# Patient Record
Sex: FEMALE | Race: WHITE | Employment: FULL TIME | URBAN - METROPOLITAN AREA
[De-identification: names, ages, dates, MRNs, and addresses within clinical notes are randomized per-mention and may not be internally consistent; named-entity substitution may affect disease eponyms.]

---

## 2019-06-10 ENCOUNTER — HOSPITAL ENCOUNTER (EMERGENCY)
Age: 29
Discharge: HOME | End: 2019-06-10
Attending: EMERGENCY MEDICINE
Payer: MEDICARE

## 2019-06-10 ENCOUNTER — APPOINTMENT (OUTPATIENT)
Dept: GENERAL RADIOLOGY | Age: 29
End: 2019-06-10
Payer: MEDICARE

## 2019-06-10 VITALS
OXYGEN SATURATION: 95 % | WEIGHT: 127 LBS | DIASTOLIC BLOOD PRESSURE: 59 MMHG | HEIGHT: 62 IN | TEMPERATURE: 98.2 F | RESPIRATION RATE: 16 BRPM | BODY MASS INDEX: 23.37 KG/M2 | HEART RATE: 61 BPM | SYSTOLIC BLOOD PRESSURE: 112 MMHG

## 2019-06-10 DIAGNOSIS — N39.0 URINARY TRACT INFECTION WITHOUT HEMATURIA, SITE UNSPECIFIED: Primary | ICD-10-CM

## 2019-06-10 DIAGNOSIS — R07.9 CHEST PAIN, UNSPECIFIED TYPE: ICD-10-CM

## 2019-06-10 LAB
-: ABNORMAL
ABSOLUTE EOS #: <0.03 K/UL (ref 0–0.44)
ABSOLUTE IMMATURE GRANULOCYTE: 0.02 K/UL (ref 0–0.3)
ABSOLUTE LYMPH #: 3.12 K/UL (ref 1.1–3.7)
ABSOLUTE MONO #: 0.43 K/UL (ref 0.1–1.2)
AMORPHOUS: ABNORMAL
ANION GAP SERPL CALCULATED.3IONS-SCNC: 12 MMOL/L (ref 9–17)
BACTERIA: ABNORMAL
BASOPHILS # BLD: 0 % (ref 0–2)
BASOPHILS ABSOLUTE: <0.03 K/UL (ref 0–0.2)
BILIRUBIN URINE: NEGATIVE
BUN BLDV-MCNC: 5 MG/DL (ref 6–20)
BUN/CREAT BLD: 8 (ref 9–20)
CALCIUM SERPL-MCNC: 9.3 MG/DL (ref 8.6–10.4)
CASTS UA: ABNORMAL /LPF
CHLORIDE BLD-SCNC: 99 MMOL/L (ref 98–107)
CHP ED QC CHECK: NORMAL
CO2: 27 MMOL/L (ref 20–31)
COLOR: YELLOW
COMMENT UA: ABNORMAL
CREAT SERPL-MCNC: 0.65 MG/DL (ref 0.5–0.9)
CRYSTALS, UA: ABNORMAL /HPF
D-DIMER QUANTITATIVE: 0.24 MG/L FEU
DIFFERENTIAL TYPE: ABNORMAL
EOSINOPHILS RELATIVE PERCENT: 0 % (ref 1–4)
EPITHELIAL CELLS UA: ABNORMAL /HPF (ref 0–5)
GFR AFRICAN AMERICAN: >60 ML/MIN
GFR NON-AFRICAN AMERICAN: >60 ML/MIN
GFR SERPL CREATININE-BSD FRML MDRD: ABNORMAL ML/MIN/{1.73_M2}
GFR SERPL CREATININE-BSD FRML MDRD: ABNORMAL ML/MIN/{1.73_M2}
GLUCOSE BLD-MCNC: 96 MG/DL (ref 70–99)
GLUCOSE URINE: NEGATIVE
HCT VFR BLD CALC: 40.5 % (ref 36.3–47.1)
HEMOGLOBIN: 12.5 G/DL (ref 11.9–15.1)
IMMATURE GRANULOCYTES: 0 %
KETONES, URINE: NEGATIVE
LEUKOCYTE ESTERASE, URINE: ABNORMAL
LYMPHOCYTES # BLD: 36 % (ref 24–43)
MCH RBC QN AUTO: 25.6 PG (ref 25.2–33.5)
MCHC RBC AUTO-ENTMCNC: 30.9 G/DL (ref 28.4–34.8)
MCV RBC AUTO: 83 FL (ref 82.6–102.9)
MONOCYTES # BLD: 5 % (ref 3–12)
MUCUS: ABNORMAL
NITRITE, URINE: POSITIVE
NRBC AUTOMATED: 0 PER 100 WBC
OTHER OBSERVATIONS UA: ABNORMAL
PDW BLD-RTO: 13.8 % (ref 11.8–14.4)
PH UA: 6 (ref 5–8)
PLATELET # BLD: 250 K/UL (ref 138–453)
PLATELET ESTIMATE: ABNORMAL
PMV BLD AUTO: 10.8 FL (ref 8.1–13.5)
POTASSIUM SERPL-SCNC: 3.9 MMOL/L (ref 3.7–5.3)
PREGNANCY TEST URINE, POC: NEGATIVE
PROTEIN UA: NEGATIVE
RBC # BLD: 4.88 M/UL (ref 3.95–5.11)
RBC # BLD: ABNORMAL 10*6/UL
RBC UA: ABNORMAL /HPF (ref 0–2)
RENAL EPITHELIAL, UA: ABNORMAL /HPF
SEG NEUTROPHILS: 59 % (ref 36–65)
SEGMENTED NEUTROPHILS ABSOLUTE COUNT: 5.09 K/UL (ref 1.5–8.1)
SODIUM BLD-SCNC: 138 MMOL/L (ref 135–144)
SPECIFIC GRAVITY UA: 1.01 (ref 1–1.03)
TRICHOMONAS: ABNORMAL
TURBIDITY: ABNORMAL
URINE HGB: NEGATIVE
UROBILINOGEN, URINE: NORMAL
WBC # BLD: 8.7 K/UL (ref 3.5–11.3)
WBC # BLD: ABNORMAL 10*3/UL
WBC UA: ABNORMAL /HPF (ref 0–5)
YEAST: ABNORMAL

## 2019-06-10 PROCEDURE — 85379 FIBRIN DEGRADATION QUANT: CPT

## 2019-06-10 PROCEDURE — 81025 URINE PREGNANCY TEST: CPT

## 2019-06-10 PROCEDURE — 87088 URINE BACTERIA CULTURE: CPT

## 2019-06-10 PROCEDURE — 80048 BASIC METABOLIC PNL TOTAL CA: CPT

## 2019-06-10 PROCEDURE — 71046 X-RAY EXAM CHEST 2 VIEWS: CPT

## 2019-06-10 PROCEDURE — 85025 COMPLETE CBC W/AUTO DIFF WBC: CPT

## 2019-06-10 PROCEDURE — 99285 EMERGENCY DEPT VISIT HI MDM: CPT

## 2019-06-10 PROCEDURE — 87086 URINE CULTURE/COLONY COUNT: CPT

## 2019-06-10 PROCEDURE — 93005 ELECTROCARDIOGRAM TRACING: CPT | Performed by: PHYSICIAN ASSISTANT

## 2019-06-10 PROCEDURE — 81001 URINALYSIS AUTO W/SCOPE: CPT

## 2019-06-10 PROCEDURE — 87186 SC STD MICRODIL/AGAR DIL: CPT

## 2019-06-10 RX ORDER — IBUPROFEN 800 MG/1
800 TABLET ORAL EVERY 8 HOURS PRN
Qty: 30 TABLET | Refills: 0 | Status: SHIPPED | OUTPATIENT
Start: 2019-06-10

## 2019-06-10 RX ORDER — ACETAMINOPHEN 500 MG
500 TABLET ORAL EVERY 6 HOURS PRN
COMMUNITY

## 2019-06-10 RX ORDER — CEPHALEXIN 500 MG/1
500 CAPSULE ORAL 2 TIMES DAILY
Qty: 14 CAPSULE | Refills: 0 | Status: SHIPPED | OUTPATIENT
Start: 2019-06-10 | End: 2019-06-17

## 2019-06-10 ASSESSMENT — PAIN SCALES - GENERAL
PAINLEVEL_OUTOF10: 5
PAINLEVEL_OUTOF10: 3

## 2019-06-10 ASSESSMENT — PAIN DESCRIPTION - FREQUENCY: FREQUENCY: CONTINUOUS

## 2019-06-10 ASSESSMENT — PAIN DESCRIPTION - DESCRIPTORS: DESCRIPTORS: SHARP

## 2019-06-10 ASSESSMENT — PAIN DESCRIPTION - PAIN TYPE: TYPE: ACUTE PAIN

## 2019-06-10 ASSESSMENT — PAIN DESCRIPTION - LOCATION: LOCATION: CHEST

## 2019-06-10 ASSESSMENT — PAIN DESCRIPTION - ONSET: ONSET: SUDDEN

## 2019-06-10 NOTE — ED NOTES
ASSESSMENT:    Presents to ED per self per pvt auto with c/o sharp pain across ant chest for last 13 hours. Rt worse than lt. Pain constant but increases and decreases in intensity. Seen at [facility] ED last week for the same and only did an EKG which was normal.  Patient smokes and is on Birth control pill. Feels sob at times. Not now. Denies fever,chills, n/v.  States pain does increase some with deep breath. Not to toych. Also pain goes around to rt upper back. No distress on admission.      Dot Mead RN  06/10/19 2469

## 2019-06-10 NOTE — PROGRESS NOTES
Transitions of Care Pharmacy Service   Medication Review    The patient's list of current home medications has been reviewed. She states she just ran out of her daily birth control tab but didn't know the name of it. [facility], and [facility] where has filled meds previously to clarify but she has not filled birth control at any of these pharmacies. Unable to verify med at this time. Source(s) of information: patient, [facility], [facility], [facility]    Please feel free to call with any questions about this encounter. Thank you.     Natalia Maguire, [facility]  Transitions of Care Pharmacy Service  Phone:  [phone]  Fax: [phone]          Prior to Admission medications    Medication Sig       UNKNOWN TO PATIENT Birth control tablet once daily       acetaminophen (TYLENOL) 500 MG tablet Take 500 mg by mouth every 6 hours as needed for Pain

## 2019-06-10 NOTE — ED PROVIDER NOTES
The patient was seen and examined by me in conjunction with the mid-level provider. I agree with his/her assessment and treatment plan. Her work-up including d-dimer is negative except for urinary tract infection. Findings were discussed with the patient.      Melva Castañeda MD  06/10/19 8777

## 2019-06-10 NOTE — ED PROVIDER NOTES
[address] [place] [address] ED  eMERGENCY dEPARTMENTeNCOUnter      Pt Name: Cris Conti  MRN: [id number]  [place] 1990  Date ofevaluation: 6/10/2019  Provider: Tawny Thomas Dr       Chief Complaint   Patient presents with    Chest Pain     sharp chest pain since last night         HISTORY OF PRESENT ILLNESS  (Location/Symptom, Timing/Onset, Context/Setting, Quality, Duration, Modifying Factors, Severity.)   Cris Conti is a 34 y.o. female who presents to the emergency department with chest pain that is bilateral but worse on the right side this started yesterday around 9:00. Pain is atraumatic. Patient states it came out of nowhere with gradual onset. Pain worse with deep breaths. It is pleuritic in nature. She did restart her birth control in January after the birth of her child. Prior to that she was on birth control for roughly 2 years without any complications. Denies any history of blood clots in legs or lungs. Denies any family history of blood clots in legs or lungs. Nursing Notes were reviewed. ALLERGIES     Hydrocodone-acetaminophen and Nalbuphine    CURRENT MEDICATIONS       Discharge Medication List as of 6/10/2019 11:22 AM      CONTINUE these medications which have NOT CHANGED    Details   UNKNOWN TO PATIENT Take 1 tablet by mouth daily Birth control tablet once dailyHistorical Med      acetaminophen (TYLENOL) 500 MG tablet Take 500 mg by mouth every 6 hours as needed for PainHistorical Med             PAST MEDICAL HISTORY   History reviewed. No pertinent past medical history. SURGICAL HISTORY           Procedure Laterality Date     SECTION           FAMILY HISTORY     History reviewed. No pertinent family history. No family status information on file. SOCIAL HISTORY      reports that she has been smoking cigarettes. She has been smoking about 0.50 packs per day.  She has never used smokeless tobacco. She reports that she drinks alcohol. She reports that she has current or past drug history. Drug: IV.    REVIEW OFSYSTEMS    (2-9 systems for level 4, 10 or more for level 5)   Review of Systems    Except as noted above the remainder of the review of systems was reviewed and negative. PHYSICAL EXAM    (up to 7 for level 4, 8 or more for level 5)     ED Triage Vitals [06/10/19 0855]   BP Temp Temp Source Pulse Resp SpO2 Height Weight   (!) 120/45 98.2 °F (36.8 °C) Oral 73 18 100 % 5' 2\" (1.575 m) 127 lb (57.6 kg)      Physical Exam   Constitutional: She is oriented to person, place, and time. She appears well-developed. HENT:   Head: Normocephalic and atraumatic. Neck: Normal range of motion. Neck supple. Cardiovascular: Normal rate and regular rhythm. Pulmonary/Chest: Effort normal and breath sounds normal.   Abdominal: Soft. She exhibits no distension. There is no tenderness. Musculoskeletal: Normal range of motion. Neurological: She is alert and oriented to person, place, and time. Skin: Skin is warm. No rash noted. Psychiatric: She has a normal mood and affect. Her behavior is normal.               DIAGNOSTIC RESULTS     EKG: All EKG's are interpreted by the Emergency Department Physician who either signs or Co-signs this chart in the absence of a cardiologist.    EKG interpreted by ED physician. Time 8:50 AM.  61 bpm.  Sinus rhythm.     RADIOLOGY:   Non-plain film images such as CT, Ultrasound and MRI are read by the radiologist. Plain radiographic images arevisualized and preliminarily interpreted by the emergency physician with the below findings:        Interpretation per the Radiologist below, if available at thetime of this note:          ED BEDSIDE ULTRASOUND:   Performed by ED Physician - none    LABS:  Labs Reviewed   CBC WITH AUTO DIFFERENTIAL - Abnormal; Notable for the following components:       Result Value    Eosinophils % 0 (*)     All other components within normal limits   BASIC METABOLIC PANEL - Abnormal; Notable for the following components:    BUN 5 (*)     Bun/Cre Ratio 8 (*)     All other components within normal limits   URINE RT REFLEX TO CULTURE - Abnormal; Notable for the following components:    Turbidity UA SLIGHTLY CLOUDY (*)     Nitrite, Urine POSITIVE (*)     Leukocyte Esterase, Urine TRACE (*)     All other components within normal limits   MICROSCOPIC URINALYSIS - Abnormal; Notable for the following components:    Bacteria, UA MODERATE (*)     All other components within normal limits   POCT URINE PREGNANCY - Normal   URINE CULTURE CLEAN CATCH   D-DIMER, QUANTITATIVE       All other labs were within normal range or not returned as of this dictation. EMERGENCY DEPARTMENT COURSE and DIFFERENTIAL DIAGNOSIS/MDM:   Vitals:    Vitals:    06/10/19 0855 06/10/19 1030 06/10/19 1100 06/10/19 1109   BP: (!) 120/45   (!) 112/59   Pulse: 73 59 63 61   Resp: 18 19 15 16   Temp: 98.2 °F (36.8 °C)      TempSrc: Oral      SpO2: 100%   95%   Weight: 127 lb (57.6 kg)      Height: 5' 2\" (1.575 m)        D-dimer is negative. Chest x-ray and labs are negative. Urine was found to be foul-smelling. UTI found on urine dip and urine analysis    Treated with Keflex. Discharge home. Also given NSAIDs for chest pain. CONSULTS:  None    PROCEDURES:  Procedures        FINAL IMPRESSION      1. Urinary tract infection without hematuria, site unspecified    2. Chest pain, unspecified type          DISPOSITION/PLAN   DISPOSITION Decision To Discharge 06/10/2019 11:20:17 AM      PATIENTREFERRED TO:   No follow-up provider specified.     DISCHARGE MEDICATIONS:     Discharge Medication List as of 6/10/2019 11:22 AM      START taking these medications    Details   cephALEXin (KEFLEX) 500 MG capsule Take 1 capsule by mouth 2 times daily for 7 days, Disp-14 capsule, R-0Print      ibuprofen (ADVIL;MOTRIN) 800 MG tablet Take 1 tablet by mouth every 8 hours as needed for Pain, Disp-30 tablet, R-0Print                 (Please note that portions of this note were completed with a voice recognition program.  Efforts were made to edit thedictations but occasionally words are mis-transcribed.)    HI Chowdhury PA-C  06/10/19 2144

## 2019-06-12 LAB
CULTURE: ABNORMAL
EKG ATRIAL RATE: 61 BPM
EKG P AXIS: 6 DEGREES
EKG P-R INTERVAL: 136 MS
EKG Q-T INTERVAL: 428 MS
EKG QRS DURATION: 86 MS
EKG QTC CALCULATION (BAZETT): 430 MS
EKG R AXIS: 60 DEGREES
EKG T AXIS: 42 DEGREES
EKG VENTRICULAR RATE: 61 BPM
HCG, PREGNANCY URINE (POC): NEGATIVE
Lab: ABNORMAL
SPECIMEN DESCRIPTION: ABNORMAL

## 2019-06-12 NOTE — PROGRESS NOTES
Culture Callback Review:   Patient has positive urine culture for E Coli > 100,000 CFU/mL. Discharged from ER on antibiotic therapy with Keflex 500mg BID x 7 days . Recommendation: Discontinue antibiotics if continued no urinary symptoms. No urinary symptoms noted on chart, patient presented with chest pain. Not pregnant. High bacteria count. Would classify as asymptomatic bacteriuria . Chapis Up PharmD, [facility]  6/12/2019 10:56 AM       Above recommendation accepted per Dr. Mela Mckeon.      Chapis Up PharmD, [facility]  6/12/2019 11:29 AM

## 2020-11-08 ENCOUNTER — APPOINTMENT (OUTPATIENT)
Dept: GENERAL RADIOLOGY | Age: 30
End: 2020-11-08
Payer: MEDICARE

## 2020-11-08 ENCOUNTER — APPOINTMENT (OUTPATIENT)
Dept: CT IMAGING | Age: 30
End: 2020-11-08
Payer: MEDICARE

## 2020-11-08 ENCOUNTER — HOSPITAL ENCOUNTER (EMERGENCY)
Age: 30
Discharge: ANOTHER HOSPITAL | End: 2020-11-08
Attending: EMERGENCY MEDICINE
Payer: MEDICARE

## 2020-11-08 VITALS
HEART RATE: 70 BPM | TEMPERATURE: 97.9 F | WEIGHT: 130 LBS | RESPIRATION RATE: 18 BRPM | SYSTOLIC BLOOD PRESSURE: 98 MMHG | DIASTOLIC BLOOD PRESSURE: 64 MMHG | OXYGEN SATURATION: 100 %

## 2020-11-08 PROBLEM — J98.2 PNEUMOMEDIASTINUM (HCC): Status: ACTIVE | Noted: 2020-11-08

## 2020-11-08 PROBLEM — N17.9 ACUTE KIDNEY INJURY (HCC): Status: ACTIVE | Noted: 2020-11-08

## 2020-11-08 LAB
-: ABNORMAL
-: NORMAL
ABSOLUTE EOS #: 0 K/UL (ref 0–0.4)
ABSOLUTE IMMATURE GRANULOCYTE: 0 K/UL (ref 0–0.3)
ABSOLUTE LYMPH #: 1.15 K/UL (ref 1–4.8)
ABSOLUTE MONO #: 1.84 K/UL (ref 0.1–0.8)
ACETAMINOPHEN LEVEL: <5 UG/ML (ref 10–30)
ALBUMIN SERPL-MCNC: 5.3 G/DL (ref 3.5–5.2)
ALBUMIN/GLOBULIN RATIO: 1.3 (ref 1–2.5)
ALLEN TEST: ABNORMAL
ALLEN TEST: NORMAL
ALP BLD-CCNC: 80 U/L (ref 35–104)
ALT SERPL-CCNC: 37 U/L (ref 5–33)
AMMONIA: 42 UMOL/L (ref 11–51)
AMORPHOUS: ABNORMAL
AMPHETAMINE SCREEN URINE: NEGATIVE
ANION GAP SERPL CALCULATED.3IONS-SCNC: 17 MMOL/L (ref 9–17)
ANION GAP SERPL CALCULATED.3IONS-SCNC: 29 MMOL/L (ref 9–17)
ANION GAP: 12 MMOL/L (ref 7–16)
AST SERPL-CCNC: 56 U/L
BACTERIA: ABNORMAL
BARBITURATE SCREEN URINE: NEGATIVE
BASOPHILS # BLD: 0 % (ref 0–2)
BASOPHILS ABSOLUTE: 0 K/UL (ref 0–0.2)
BENZODIAZEPINE SCREEN, URINE: NEGATIVE
BILIRUB SERPL-MCNC: 1.19 MG/DL (ref 0.3–1.2)
BILIRUBIN URINE: NEGATIVE
BNP INTERPRETATION: ABNORMAL
BUN BLDV-MCNC: 107 MG/DL (ref 6–20)
BUN BLDV-MCNC: 96 MG/DL (ref 6–20)
BUN/CREAT BLD: ABNORMAL (ref 9–20)
BUN/CREAT BLD: ABNORMAL (ref 9–20)
BUPRENORPHINE URINE: ABNORMAL
CALCIUM SERPL-MCNC: 10.5 MG/DL (ref 8.6–10.4)
CALCIUM SERPL-MCNC: 8 MG/DL (ref 8.6–10.4)
CANNABINOID SCREEN URINE: POSITIVE
CARBOXYHEMOGLOBIN: 1 % (ref 0–5)
CASTS UA: ABNORMAL /LPF (ref 0–2)
CASTS UA: ABNORMAL /LPF (ref 0–2)
CHLORIDE BLD-SCNC: 111 MMOL/L (ref 98–107)
CHLORIDE BLD-SCNC: 94 MMOL/L (ref 98–107)
CHP ED QC CHECK: YES
CO2: 21 MMOL/L (ref 20–31)
CO2: 22 MMOL/L (ref 20–31)
COCAINE METABOLITE, URINE: POSITIVE
COLOR: ABNORMAL
COMMENT UA: ABNORMAL
CREAT SERPL-MCNC: 2.91 MG/DL (ref 0.5–0.9)
CREAT SERPL-MCNC: 4.14 MG/DL (ref 0.5–0.9)
CRYSTALS, UA: ABNORMAL /HPF
D-DIMER QUANTITATIVE: 2.23 MG/L FEU
DIFFERENTIAL TYPE: ABNORMAL
EOSINOPHILS RELATIVE PERCENT: 0 % (ref 1–4)
EPITHELIAL CELLS UA: ABNORMAL /HPF (ref 0–5)
ETHANOL PERCENT: <0.01 %
ETHANOL: <10 MG/DL
FIO2: ABNORMAL
FIO2: NORMAL
GFR AFRICAN AMERICAN: 16 ML/MIN
GFR AFRICAN AMERICAN: 23 ML/MIN
GFR NON-AFRICAN AMERICAN: 11 ML/MIN
GFR NON-AFRICAN AMERICAN: 13 ML/MIN
GFR NON-AFRICAN AMERICAN: 19 ML/MIN
GFR SERPL CREATININE-BSD FRML MDRD: 13 ML/MIN
GFR SERPL CREATININE-BSD FRML MDRD: ABNORMAL ML/MIN/{1.73_M2}
GLUCOSE BLD-MCNC: 106 MG/DL (ref 70–99)
GLUCOSE BLD-MCNC: 115 MG/DL (ref 70–99)
GLUCOSE BLD-MCNC: 127 MG/DL (ref 74–100)
GLUCOSE BLD-MCNC: 170 MG/DL
GLUCOSE BLD-MCNC: 170 MG/DL (ref 65–105)
GLUCOSE URINE: NEGATIVE
HCG QUALITATIVE: NEGATIVE
HCO3 VENOUS: 25.6 MMOL/L (ref 24–30)
HCO3 VENOUS: 27.7 MMOL/L (ref 22–29)
HCT VFR BLD CALC: 47.1 % (ref 36.3–47.1)
HEMOGLOBIN: 14.9 G/DL (ref 11.9–15.1)
IMMATURE GRANULOCYTES: 0 %
INR BLD: 1.1
KETONES, URINE: ABNORMAL
LACTIC ACID, SEPSIS WHOLE BLOOD: 1.9 MMOL/L (ref 0.5–1.9)
LACTIC ACID, SEPSIS WHOLE BLOOD: 2.6 MMOL/L (ref 0.5–1.9)
LACTIC ACID, SEPSIS: ABNORMAL MMOL/L (ref 0.5–1.9)
LACTIC ACID, SEPSIS: NORMAL MMOL/L (ref 0.5–1.9)
LEUKOCYTE ESTERASE, URINE: ABNORMAL
LYMPHOCYTES # BLD: 5 % (ref 24–44)
MCH RBC QN AUTO: 24 PG (ref 25.2–33.5)
MCHC RBC AUTO-ENTMCNC: 31.6 G/DL (ref 28.4–34.8)
MCV RBC AUTO: 76 FL (ref 82.6–102.9)
MDMA URINE: ABNORMAL
METHADONE SCREEN, URINE: NEGATIVE
METHAMPHETAMINE, URINE: ABNORMAL
METHEMOGLOBIN: NORMAL % (ref 0–1.5)
MODE: ABNORMAL
MODE: NORMAL
MONOCYTES # BLD: 8 % (ref 1–7)
MORPHOLOGY: ABNORMAL
MORPHOLOGY: ABNORMAL
MUCUS: ABNORMAL
MYOGLOBIN: 521 NG/ML (ref 25–58)
NEGATIVE BASE EXCESS, VEN: ABNORMAL (ref 0–2)
NEGATIVE BASE EXCESS, VEN: NORMAL MMOL/L (ref 0–2)
NITRITE, URINE: NEGATIVE
NOTIFICATION TIME: NORMAL
NOTIFICATION: NORMAL
NRBC AUTOMATED: 0 PER 100 WBC
O2 DEVICE/FLOW/%: ABNORMAL
O2 DEVICE/FLOW/%: NORMAL
O2 SAT, VEN: 62 % (ref 60–85)
O2 SAT, VEN: 66 % (ref 60–85)
OPIATES, URINE: NEGATIVE
OTHER OBSERVATIONS UA: ABNORMAL
OXYCODONE SCREEN URINE: NEGATIVE
OXYHEMOGLOBIN: NORMAL % (ref 95–98)
PARTIAL THROMBOPLASTIN TIME: 21.2 SEC (ref 20.5–30.5)
PARTIAL THROMBOPLASTIN TIME: 67.3 SEC (ref 20.5–30.5)
PATIENT TEMP: 37
PATIENT TEMP: ABNORMAL
PCO2, VEN, TEMP ADJ: NORMAL MMHG (ref 39–55)
PCO2, VEN: 39.6 MM HG (ref 41–51)
PCO2, VEN: 43 (ref 39–55)
PDW BLD-RTO: 17.3 % (ref 11.8–14.4)
PEEP/CPAP: NORMAL
PH UA: 5 (ref 5–8)
PH VENOUS: 7.39 (ref 7.32–7.42)
PH VENOUS: 7.45 (ref 7.32–7.43)
PH, VEN, TEMP ADJ: NORMAL (ref 7.32–7.42)
PHENCYCLIDINE, URINE: NEGATIVE
PLATELET # BLD: ABNORMAL K/UL (ref 138–453)
PLATELET ESTIMATE: ABNORMAL
PLATELET, FLUORESCENCE: 215 K/UL (ref 138–453)
PLATELET, IMMATURE FRACTION: 6.4 % (ref 1.1–10.3)
PMV BLD AUTO: ABNORMAL FL (ref 8.1–13.5)
PO2, VEN, TEMP ADJ: NORMAL MMHG (ref 30–50)
PO2, VEN: 30.4 MM HG (ref 30–50)
PO2, VEN: 41.9 (ref 30–50)
POC CHLORIDE: 107 MMOL/L (ref 98–107)
POC CREATININE: 4.72 MG/DL (ref 0.51–1.19)
POC HEMATOCRIT: 60 % (ref 36–46)
POC HEMOGLOBIN: 20.3 G/DL (ref 12–16)
POC IONIZED CALCIUM: 1.01 MMOL/L (ref 1.15–1.33)
POC LACTIC ACID: 2.66 MMOL/L (ref 0.56–1.39)
POC PCO2 TEMP: ABNORMAL MM HG
POC PH TEMP: ABNORMAL
POC PO2 TEMP: ABNORMAL MM HG
POC POTASSIUM: 3.8 MMOL/L (ref 3.5–4.5)
POC SODIUM: 147 MMOL/L (ref 138–146)
POSITIVE BASE EXCESS, VEN: 0.9 MMOL/L (ref 0–2)
POSITIVE BASE EXCESS, VEN: 4 (ref 0–3)
POTASSIUM SERPL-SCNC: 2.8 MMOL/L (ref 3.7–5.3)
POTASSIUM SERPL-SCNC: 3.6 MMOL/L (ref 3.7–5.3)
PRO-BNP: 2232 PG/ML
PROPOXYPHENE, URINE: ABNORMAL
PROTEIN UA: ABNORMAL
PROTHROMBIN TIME: 11.1 SEC (ref 9–12)
PSV: NORMAL
PT. POSITION: NORMAL
RBC # BLD: 6.2 M/UL (ref 3.95–5.11)
RBC # BLD: ABNORMAL 10*6/UL
RBC UA: ABNORMAL /HPF (ref 0–2)
REASON FOR REJECTION: NORMAL
RENAL EPITHELIAL, UA: ABNORMAL /HPF
RESPIRATORY RATE: NORMAL
SALICYLATE LEVEL: <1 MG/DL (ref 3–10)
SAMPLE SITE: ABNORMAL
SAMPLE SITE: NORMAL
SARS-COV-2, RAPID: NOT DETECTED
SARS-COV-2: NORMAL
SARS-COV-2: NORMAL
SEG NEUTROPHILS: 87 % (ref 36–66)
SEGMENTED NEUTROPHILS ABSOLUTE COUNT: 20.01 K/UL (ref 1.8–7.7)
SET RATE: NORMAL
SODIUM BLD-SCNC: 145 MMOL/L (ref 135–144)
SODIUM BLD-SCNC: 149 MMOL/L (ref 135–144)
SOURCE: NORMAL
SPECIFIC GRAVITY UA: 1.02 (ref 1–1.03)
TEST INFORMATION: ABNORMAL
TEXT FOR RESPIRATORY: NORMAL
TOTAL CK: 130 U/L (ref 26–192)
TOTAL CO2, VENOUS: 29 MMOL/L (ref 23–30)
TOTAL HB: NORMAL G/DL (ref 12–16)
TOTAL PROTEIN: 9.3 G/DL (ref 6.4–8.3)
TOTAL RATE: NORMAL
TOXIC TRICYCLIC SC,BLOOD: NEGATIVE
TRICHOMONAS: ABNORMAL
TRICYCLIC ANTIDEPRESSANTS, UR: ABNORMAL
TROPONIN INTERP: ABNORMAL
TROPONIN INTERP: ABNORMAL
TROPONIN T: ABNORMAL NG/ML
TROPONIN T: ABNORMAL NG/ML
TROPONIN, HIGH SENSITIVITY: 134 NG/L (ref 0–14)
TROPONIN, HIGH SENSITIVITY: 150 NG/L (ref 0–14)
TURBIDITY: ABNORMAL
URINE HGB: ABNORMAL
UROBILINOGEN, URINE: NORMAL
VT: NORMAL
WBC # BLD: 23 K/UL (ref 3.5–11.3)
WBC # BLD: ABNORMAL 10*3/UL
WBC UA: ABNORMAL /HPF (ref 0–5)
YEAST: ABNORMAL
ZZ NTE CLEAN UP: ORDERED TEST: NORMAL
ZZ NTE WITH NAME CLEAN UP: SPECIMEN SOURCE: NORMAL

## 2020-11-08 PROCEDURE — 85730 THROMBOPLASTIN TIME PARTIAL: CPT

## 2020-11-08 PROCEDURE — 85610 PROTHROMBIN TIME: CPT

## 2020-11-08 PROCEDURE — G0480 DRUG TEST DEF 1-7 CLASSES: HCPCS

## 2020-11-08 PROCEDURE — 84132 ASSAY OF SERUM POTASSIUM: CPT

## 2020-11-08 PROCEDURE — 6360000002 HC RX W HCPCS: Performed by: STUDENT IN AN ORGANIZED HEALTH CARE EDUCATION/TRAINING PROGRAM

## 2020-11-08 PROCEDURE — 31500 INSERT EMERGENCY AIRWAY: CPT

## 2020-11-08 PROCEDURE — 96375 TX/PRO/DX INJ NEW DRUG ADDON: CPT

## 2020-11-08 PROCEDURE — 85025 COMPLETE CBC W/AUTO DIFF WBC: CPT

## 2020-11-08 PROCEDURE — 99285 EMERGENCY DEPT VISIT HI MDM: CPT

## 2020-11-08 PROCEDURE — 93005 ELECTROCARDIOGRAM TRACING: CPT | Performed by: STUDENT IN AN ORGANIZED HEALTH CARE EDUCATION/TRAINING PROGRAM

## 2020-11-08 PROCEDURE — 82803 BLOOD GASES ANY COMBINATION: CPT

## 2020-11-08 PROCEDURE — 81001 URINALYSIS AUTO W/SCOPE: CPT

## 2020-11-08 PROCEDURE — 87086 URINE CULTURE/COLONY COUNT: CPT

## 2020-11-08 PROCEDURE — 6360000002 HC RX W HCPCS

## 2020-11-08 PROCEDURE — 83880 ASSAY OF NATRIURETIC PEPTIDE: CPT

## 2020-11-08 PROCEDURE — 93005 ELECTROCARDIOGRAM TRACING: CPT | Performed by: EMERGENCY MEDICINE

## 2020-11-08 PROCEDURE — 2500000003 HC RX 250 WO HCPCS

## 2020-11-08 PROCEDURE — 71045 X-RAY EXAM CHEST 1 VIEW: CPT

## 2020-11-08 PROCEDURE — 96361 HYDRATE IV INFUSION ADD-ON: CPT

## 2020-11-08 PROCEDURE — 84295 ASSAY OF SERUM SODIUM: CPT

## 2020-11-08 PROCEDURE — 80048 BASIC METABOLIC PNL TOTAL CA: CPT

## 2020-11-08 PROCEDURE — 74220 X-RAY XM ESOPHAGUS 1CNTRST: CPT

## 2020-11-08 PROCEDURE — 36415 COLL VENOUS BLD VENIPUNCTURE: CPT

## 2020-11-08 PROCEDURE — 96366 THER/PROPH/DIAG IV INF ADDON: CPT

## 2020-11-08 PROCEDURE — 84703 CHORIONIC GONADOTROPIN ASSAY: CPT

## 2020-11-08 PROCEDURE — 87040 BLOOD CULTURE FOR BACTERIA: CPT

## 2020-11-08 PROCEDURE — 83605 ASSAY OF LACTIC ACID: CPT

## 2020-11-08 PROCEDURE — 80307 DRUG TEST PRSMV CHEM ANLYZR: CPT

## 2020-11-08 PROCEDURE — 82140 ASSAY OF AMMONIA: CPT

## 2020-11-08 PROCEDURE — U0002 COVID-19 LAB TEST NON-CDC: HCPCS

## 2020-11-08 PROCEDURE — 82805 BLOOD GASES W/O2 SATURATION: CPT

## 2020-11-08 PROCEDURE — 82330 ASSAY OF CALCIUM: CPT

## 2020-11-08 PROCEDURE — 94761 N-INVAS EAR/PLS OXIMETRY MLT: CPT

## 2020-11-08 PROCEDURE — 36556 INSERT NON-TUNNEL CV CATH: CPT

## 2020-11-08 PROCEDURE — 82947 ASSAY GLUCOSE BLOOD QUANT: CPT

## 2020-11-08 PROCEDURE — 82435 ASSAY OF BLOOD CHLORIDE: CPT

## 2020-11-08 PROCEDURE — 82550 ASSAY OF CK (CPK): CPT

## 2020-11-08 PROCEDURE — 2700000000 HC OXYGEN THERAPY PER DAY

## 2020-11-08 PROCEDURE — 82565 ASSAY OF CREATININE: CPT

## 2020-11-08 PROCEDURE — 85379 FIBRIN DEGRADATION QUANT: CPT

## 2020-11-08 PROCEDURE — 51702 INSERT TEMP BLADDER CATH: CPT

## 2020-11-08 PROCEDURE — 84484 ASSAY OF TROPONIN QUANT: CPT

## 2020-11-08 PROCEDURE — 83874 ASSAY OF MYOGLOBIN: CPT

## 2020-11-08 PROCEDURE — 96368 THER/DIAG CONCURRENT INF: CPT

## 2020-11-08 PROCEDURE — 96367 TX/PROPH/DG ADDL SEQ IV INF: CPT

## 2020-11-08 PROCEDURE — 94770 HC ETCO2 MONITOR DAILY: CPT

## 2020-11-08 PROCEDURE — 80053 COMPREHEN METABOLIC PANEL: CPT

## 2020-11-08 PROCEDURE — 71250 CT THORAX DX C-: CPT

## 2020-11-08 PROCEDURE — 2580000003 HC RX 258: Performed by: STUDENT IN AN ORGANIZED HEALTH CARE EDUCATION/TRAINING PROGRAM

## 2020-11-08 PROCEDURE — 6360000004 HC RX CONTRAST MEDICATION: Performed by: STUDENT IN AN ORGANIZED HEALTH CARE EDUCATION/TRAINING PROGRAM

## 2020-11-08 PROCEDURE — 85055 RETICULATED PLATELET ASSAY: CPT

## 2020-11-08 PROCEDURE — 96365 THER/PROPH/DIAG IV INF INIT: CPT

## 2020-11-08 PROCEDURE — 85014 HEMATOCRIT: CPT

## 2020-11-08 RX ORDER — 0.9 % SODIUM CHLORIDE 0.9 %
1000 INTRAVENOUS SOLUTION INTRAVENOUS ONCE
Status: COMPLETED | OUTPATIENT
Start: 2020-11-08 | End: 2020-11-08

## 2020-11-08 RX ORDER — PROPOFOL 10 MG/ML
10 INJECTION, EMULSION INTRAVENOUS
Status: DISCONTINUED | OUTPATIENT
Start: 2020-11-08 | End: 2020-11-09 | Stop reason: HOSPADM

## 2020-11-08 RX ORDER — HEPARIN SODIUM 1000 [USP'U]/ML
80 INJECTION, SOLUTION INTRAVENOUS; SUBCUTANEOUS PRN
Status: DISCONTINUED | OUTPATIENT
Start: 2020-11-08 | End: 2020-11-09 | Stop reason: HOSPADM

## 2020-11-08 RX ORDER — ETOMIDATE 2 MG/ML
INJECTION INTRAVENOUS
Status: DISCONTINUED
Start: 2020-11-08 | End: 2020-11-09 | Stop reason: HOSPADM

## 2020-11-08 RX ORDER — MAGNESIUM SULFATE 1 G/100ML
1 INJECTION INTRAVENOUS
Status: COMPLETED | OUTPATIENT
Start: 2020-11-08 | End: 2020-11-08

## 2020-11-08 RX ORDER — POTASSIUM CHLORIDE 7.45 MG/ML
10 INJECTION INTRAVENOUS
Status: ACTIVE | OUTPATIENT
Start: 2020-11-08 | End: 2020-11-08

## 2020-11-08 RX ORDER — VANCOMYCIN HYDROCHLORIDE 1 G/200ML
1000 INJECTION, SOLUTION INTRAVENOUS ONCE
Status: COMPLETED | OUTPATIENT
Start: 2020-11-08 | End: 2020-11-09

## 2020-11-08 RX ORDER — 0.9 % SODIUM CHLORIDE 0.9 %
1000 INTRAVENOUS SOLUTION INTRAVENOUS ONCE
Status: COMPLETED | OUTPATIENT
Start: 2020-11-08 | End: 2020-11-09

## 2020-11-08 RX ORDER — HEPARIN SODIUM 10000 [USP'U]/100ML
18 INJECTION, SOLUTION INTRAVENOUS CONTINUOUS
Status: DISCONTINUED | OUTPATIENT
Start: 2020-11-08 | End: 2020-11-09 | Stop reason: HOSPADM

## 2020-11-08 RX ORDER — HEPARIN SODIUM 1000 [USP'U]/ML
80 INJECTION, SOLUTION INTRAVENOUS; SUBCUTANEOUS ONCE
Status: COMPLETED | OUTPATIENT
Start: 2020-11-08 | End: 2020-11-08

## 2020-11-08 RX ORDER — HEPARIN SODIUM 1000 [USP'U]/ML
40 INJECTION, SOLUTION INTRAVENOUS; SUBCUTANEOUS PRN
Status: DISCONTINUED | OUTPATIENT
Start: 2020-11-08 | End: 2020-11-09 | Stop reason: HOSPADM

## 2020-11-08 RX ORDER — POTASSIUM CHLORIDE 7.45 MG/ML
10 INJECTION INTRAVENOUS
Status: DISPENSED | OUTPATIENT
Start: 2020-11-08 | End: 2020-11-08

## 2020-11-08 RX ORDER — PROPOFOL 10 MG/ML
INJECTION, EMULSION INTRAVENOUS
Status: COMPLETED
Start: 2020-11-08 | End: 2020-11-08

## 2020-11-08 RX ADMIN — PROPOFOL 20 MCG/KG/MIN: 10 INJECTION, EMULSION INTRAVENOUS at 14:50

## 2020-11-08 RX ADMIN — DIATRIZOATE MEGLUMINE AND DIATRIZOATE SODIUM 50 ML: 660; 100 LIQUID ORAL; RECTAL at 09:20

## 2020-11-08 RX ADMIN — SODIUM CHLORIDE 1000 ML: 9 INJECTION, SOLUTION INTRAVENOUS at 02:50

## 2020-11-08 RX ADMIN — VANCOMYCIN HYDROCHLORIDE 1000 MG: 1 INJECTION, SOLUTION INTRAVENOUS at 03:52

## 2020-11-08 RX ADMIN — SODIUM CHLORIDE 1000 ML: 9 INJECTION, SOLUTION INTRAVENOUS at 05:42

## 2020-11-08 RX ADMIN — POTASSIUM CHLORIDE 10 MEQ: 7.46 INJECTION, SOLUTION INTRAVENOUS at 08:51

## 2020-11-08 RX ADMIN — POTASSIUM CHLORIDE 10 MEQ: 7.46 INJECTION, SOLUTION INTRAVENOUS at 11:25

## 2020-11-08 RX ADMIN — MAGNESIUM SULFATE HEPTAHYDRATE 1 G: 1 INJECTION, SOLUTION INTRAVENOUS at 10:05

## 2020-11-08 RX ADMIN — POTASSIUM CHLORIDE 10 MEQ: 7.46 INJECTION, SOLUTION INTRAVENOUS at 10:05

## 2020-11-08 RX ADMIN — PIPERACILLIN AND TAZOBACTAM 4.5 G: 4; .5 INJECTION, POWDER, LYOPHILIZED, FOR SOLUTION INTRAVENOUS; PARENTERAL at 05:07

## 2020-11-08 RX ADMIN — MAGNESIUM SULFATE HEPTAHYDRATE 1 G: 1 INJECTION, SOLUTION INTRAVENOUS at 08:52

## 2020-11-08 RX ADMIN — HEPARIN SODIUM 4720 UNITS: 1000 INJECTION INTRAVENOUS; SUBCUTANEOUS at 05:38

## 2020-11-08 RX ADMIN — POTASSIUM CHLORIDE 10 MEQ: 7.46 INJECTION, SOLUTION INTRAVENOUS at 13:11

## 2020-11-08 RX ADMIN — HEPARIN SODIUM AND DEXTROSE 18 UNITS/KG/HR: 10000; 5 INJECTION INTRAVENOUS at 05:38

## 2020-11-08 ASSESSMENT — ENCOUNTER SYMPTOMS: TACHYPNEA: 1

## 2020-11-08 NOTE — ED NOTES
Intubated with a 7.5 ett @ 25 lip    . Suction given Cords visualized Good breaths sound.  Positive change CXR ordered     Jones Anaya RN  11/08/20 6839

## 2020-11-08 NOTE — ED NOTES
Report called to [facility] at [facility].        King Lemus RN  11/08/20 4581

## 2020-11-08 NOTE — CONSULTS
[facility]   Consult Note                 Date:   11/8/2020  Date of admission:  11/8/2020  1:42 AM  MRN:   [id number]  YOB: 1990    Reason for Consult: Elevated tropes    HISTORY OF PRESENT ILLNESS:    The patient is a 31 y.o.  female who is admitted to the hospital .  A 29-year-old female incarcerated brought to the ER with history of hematemesis. Patient is somewhat confused not responding much to the questions not answering to the questions. Denies any chest pain pressure patient first tropes 134, proBNP 2232, creatinine 4.14 now 2.91 patient is on high flow. Chest x-ray shows pneumothorax around the esophagus, CT multiple bilateral groundglass nodular opacities suggestive of infection, small amount of pneumonia mediastinum in the posterior mediastinum base around the esophagus  indeterminate etiology  Patient is awaiting to go to the other facility as also facial surgery is not done here and patient cannot cross straight line waiting to be excepted in Critical access hospital    Past Medical History:   has no past medical history on file. Past Surgical History:   has no past surgical history on file. Home Medications:    Prior to Admission medications    Not on File       Current Medications: Scheduled Meds:   potassium chloride  10 mEq Intravenous Q1H    magnesium sulfate  1 g Intravenous Q1H     Continuous Infusions:   heparin (PORCINE) Infusion 18 Units/kg/hr (11/08/20 0538)     PRN Meds:.heparin (porcine), heparin (porcine)     Allergies:  Patient has no known allergies. Social History:        Family History:     Review of Systems hard to get from patient  CONSTITUTIONAL:     EYES:     HEENT:     RESPIRATORY:     CARDIOVASCULAR:     GASTROINTESTINAL:     GENITOURINARY:     MUSCULOSKELETAL:     NEUROLOGICAL:     PSYCHIATRIC:                PHYSICAL EXAM:    Blood pressure 112/84, pulse 86, temperature 97.9 °F (36.6 °C), temperature source Oral, resp.  rate 30, weight 130 lb (59 kg), SpO2 91 %. CONSTITUTIONAL: AOx4, no apparent distress, somewhat confused  HEAD: normocephalic, atraumatic   EYES: PERRLA, EOMI   ENT: moist mucous membranes, uvula midline   NECK:  symmetric, no midline tenderness to palpation   LUNGS: clear to auscultation bilaterally   CARDIOVASCULAR: regular rate and rhythm, no murmurs, rubs or gallops   ABDOMEN: Soft, non-tender, non-distended with normal active bowel sounds   SKIN: no rash       DATA:    ECG: Normal sinus rhythm, marked ST depressions LVH  Echo:  Stress:  Cath:    Labs:     CBC:   Recent Labs     11/08/20 0244   WBC 23.0*   HGB 14.9   HCT 47.1   PLT See Reflexed IPF Result     BMP:   Recent Labs     11/08/20 0244 11/08/20 0537 11/08/20 0717   *  --  149*   K 3.6*  --  2.8*   CO2 22  --  21   *  --  96*   CREATININE 4.14*  --  2.91*   LABGLOM 13*  --  19*   GLUCOSE 115* 170 106*     BNP: No results for input(s): BNP in the last 72 hours. PT/INR:   Recent Labs     11/08/20  0250   PROTIME 11.1   INR 1.1     APTT:  Recent Labs     11/08/20 0250   APTT 21.2     CARDIAC ENZYMES:  Recent Labs     11/08/20 0244   CKTOTAL 130     FASTING LIPID PANEL:No results found for: HDL, LDLDIRECT, LDLCALC, TRIG  LIVER PROFILE:  Recent Labs     11/08/20 0244   AST 56*   ALT 37*   LABALBU 5.3*       Intake/Output Summary (Last 24 hours) at 11/8/2020 8616  Last data filed at 11/8/2020 7863  Gross per 24 hour   Intake 2300 ml   Output 32 ml   Net 2268 ml       IMPRESSION:    There is no problem list on file for this patient. RECOMMENDATIONS:  Positive tropes with marked EKG ST depressions given the history of cocaine, anemia , possible type I, given hematemesis, with possible rupture esophagus with pneumothorax  Patient need surgery at this time patient is awaiting to be excepted in [facility] clinic, as patient is incarcerated patient cannot cross the state line to [place]  At this time anticoagulation will be waited.   Patient denies any chest pain at this time  ANTIONE getting better with IV hydration may need also nephrology to be involved  Hypertension is fairly controlled, heart rate is controlled  Patient is positive for cocaine and cannabis. Patient H&H need to be watched  Discussed with patient and nursing.     Paty Hendrix [facility]        [phone]

## 2020-11-08 NOTE — ED NOTES
Pt complaining of lower abdominal pain.  Dr. Tana Soto at bedside with med student     Ashanti Angel, CASANDRA  11/08/20 2241

## 2020-11-08 NOTE — ED NOTES
Writer had guards removed handcuffs to promote comfort. Patient is not violent. Awaiting transport. NRB is place. VS as charted. NAD noted at this time. Guards remain at bedside.       Milka Curiel RN  11/08/20 3914

## 2020-11-08 NOTE — ED PROVIDER NOTES
[address] [facility]  ED  Emergency Department Encounter  EmergencyMedicine Resident     Pt Name:Tabitha Gomez  MRN: [id number]  [place] 1990  Date of evaluation: 11/8/20  PCP:  No primary care provider on file. CHIEF COMPLAINT       Chief Complaint   Patient presents with    Altered Mental Status    Withdrawal       HISTORY OF PRESENT ILLNESS  (Location/Symptom, Timing/Onset, Context/Setting, Quality, Duration, Modifying Factors, Severity.)      Melene Olszewski is a 31 y.o. female who presents with altered mental status and 1 day history of multiple episodes of nonbloody emesis. Patient was brought by [employer], incarcerated. Reported patient may have overdosed on Xanax and heroin, may be withdrawing. Patient was short of breath and satting at 80 when EMS arrived. Patient hypotensive with systolic in the 593W. Patient not complaining of pain, selectively verbal.    Patient tachycardic to 148, respirate in the 40s. Blood pressure systolic 985. Afebrile. Satting at 88 on room air, now satting at 95 on 15 L of nonrebreather. Patient had poor vascular access, central line was done at right femoral.    PAST MEDICAL / SURGICAL / SOCIAL / FAMILY HISTORY      has no past medical history on file. has no past surgical history on file.       Social History     Socioeconomic History    Marital status: Single     Spouse name: Not on file    Number of children: Not on file    Years of education: Not on file    Highest education level: Not on file   Occupational History    Not on file   Social Needs    Financial resource strain: Not on file    Food insecurity     Worry: Not on file     Inability: Not on file    Transportation needs     Medical: Not on file     Non-medical: Not on file   Tobacco Use    Smoking status: Not on file   Substance and Sexual Activity    Alcohol use: Not on file    Drug use: Not on file    Sexual activity: Not on file   Lifestyle    Physical activity     Days subscore is 6. Comments: Selectively responsive, zoning in and out   Psychiatric:         Mood and Affect: Mood normal.         DIFFERENTIAL  DIAGNOSIS     PLAN (LABS / IMAGING / EKG):  Orders Placed This Encounter   Procedures    Culture, Blood 1    Culture, Blood 1    Culture, Urine    XR CHEST PORTABLE    CT CHEST ABDOMEN PELVIS WO CONTRAST    FL ESOPHAGRAM    CBC WITH AUTO DIFFERENTIAL    COMPREHENSIVE METABOLIC PANEL    Urinalysis Reflex to Culture    HCG Qualitative, Serum    Urine Drug Screen    TOX SCR, BLD, ED    Lactate, Sepsis    Brain Natriuretic Peptide    Troponin    SPECIMEN REJECTION    Hemoglobin and hematocrit, blood    SODIUM (POC)    POTASSIUM (POC)    CHLORIDE (POC)    CALCIUM, IONIC (POC)    COVID-19    Protime-INR    APTT    Blood Gas, Venous    Immature Platelet Fraction    Troponin    CK    Myoglobin, Serum    D-Dimer, Quantitative    Ammonia    PREVIOUS SPECIMEN    CBC    APTT    Microscopic Urinalysis    BASIC METABOLIC PANEL    POTASSIUM    Diet NPO Effective Now    Inpatient consult to Nephrology    Inpatient consult to Cardiology    Inpatient consult to Cardiothoracic Surgery    Heated/ Humidified High Flow Nasal Cannula    Venous Blood Gas, POC    Creatinine W/GFR Point of Care    Lactic Acid, POC    POCT Glucose    Anion Gap (Calc) POC    POCT Glucose    POC Glucose Fingerstick    EKG 12 Lead    EKG 12 Lead    Insert/Change Stuart Catheter       MEDICATIONS ORDERED:  Orders Placed This Encounter   Medications    0.9 % sodium chloride bolus    piperacillin-tazobactam (ZOSYN) 4.5 g in dextrose 5 % 100 mL IVPB (mini-bag)     Order Specific Question:   Antimicrobial Indications     Answer: Other     Order Specific Question:   Other Abx Indication     Answer:   Sepsis    vancomycin (VANCOCIN) 1000 mg in dextrose 5% 200 mL IVPB     Order Specific Question:   Antimicrobial Indications     Answer:    Other     Order Specific Question:   Other Abx Indication     Answer:   Sepsis    heparin (porcine) injection 4,720 Units    heparin (porcine) injection 4,720 Units    heparin (porcine) injection 2,360 Units    heparin 25,000 units in dextrose 5% 250 mL infusion    0.9 % sodium chloride bolus    potassium chloride 10 mEq/100 mL IVPB (Peripheral Line)    magnesium sulfate 1 g in dextrose 5% 100 mL IVPB    diatrizoate meglumine-sodium (GASTROGRAFIN) 66-10 % solution 50 mL       DDX: Drug withdrawal, intoxication, dehydration, electrolyte encephalopathy, sepsis    DIAGNOSTIC RESULTS / EMERGENCY DEPARTMENT COURSE / MDM   LAB RESULTS:  Results for orders placed or performed during the hospital encounter of 11/08/20   Culture, Blood 1    Specimen: Blood   Result Value Ref Range    Specimen Description . BLOOD     Special Requests RT FEM 10ML     Culture NO GROWTH 4 HOURS    Culture, Blood 1    Specimen: Blood   Result Value Ref Range    Specimen Description . BLOOD     Special Requests LT AC4ML     Culture NO GROWTH 4 HOURS    CBC WITH AUTO DIFFERENTIAL   Result Value Ref Range    WBC 23.0 (H) 3.5 - 11.3 k/uL    RBC 6.20 (H) 3.95 - 5.11 m/uL    Hemoglobin 14.9 11.9 - 15.1 g/dL    Hematocrit 47.1 36.3 - 47.1 %    MCV 76.0 (L) 82.6 - 102.9 fL    MCH 24.0 (L) 25.2 - 33.5 pg    MCHC 31.6 28.4 - 34.8 g/dL    RDW 17.3 (H) 11.8 - 14.4 %    Platelets See Reflexed IPF Result 138 - 453 k/uL    MPV NOT REPORTED 8.1 - 13.5 fL    NRBC Automated 0.0 0.0 per 100 WBC    Differential Type NOT REPORTED     WBC Morphology NOT REPORTED     RBC Morphology NOT REPORTED     Platelet Estimate NOT REPORTED     Immature Granulocytes 0 0 %    Seg Neutrophils 87 (H) 36 - 66 %    Lymphocytes 5 (L) 24 - 44 %    Monocytes 8 (H) 1 - 7 %    Eosinophils % 0 (L) 1 - 4 %    Basophils 0 0 - 2 %    Absolute Immature Granulocyte 0.00 0.00 - 0.30 k/uL    Segs Absolute 20.01 (H) 1.8 - 7.7 k/uL    Absolute Lymph # 1.15 1.0 - 4.8 k/uL    Absolute Mono # 1.84 (H) 0.1 - 0.8 k/uL NOT REPORTED NEGATIVE    Cannabinoid Scrn, Ur POSITIVE (A) NEGATIVE    Oxycodone Screen, Ur NEGATIVE NEGATIVE    Methamphetamine, Urine NOT REPORTED NEGATIVE    Tricyclic Antidepressants, Urine NOT REPORTED NEGATIVE    MDMA, Urine NOT REPORTED NEGATIVE    Buprenorphine Urine NOT REPORTED NEGATIVE    Test Information       Assay provides medical screening only. The absence of expected drug(s) and/or metabolite(s) may indicate diluted or adulterated urine, limitations of testing or timing of collection. TOX SCR, BLD, ED   Result Value Ref Range    Acetaminophen Level <5 (L) 10 - 30 ug/mL    Ethanol <10 <10 mg/dL    Ethanol percent <8.740 <1.790 %    Salicylate Lvl <1 (L) 3 - 10 mg/dL    Toxic Tricyclic Sc,Blood NEGATIVE NEGATIVE   Lactate, Sepsis   Result Value Ref Range    Lactic Acid, Sepsis NOT REPORTED 0.5 - 1.9 mmol/L    Lactic Acid, Sepsis, Whole Blood 2.6 (H) 0.5 - 1.9 mmol/L   Lactate, Sepsis   Result Value Ref Range    Lactic Acid, Sepsis NOT REPORTED 0.5 - 1.9 mmol/L    Lactic Acid, Sepsis, Whole Blood 1.9 0.5 - 1.9 mmol/L   Brain Natriuretic Peptide   Result Value Ref Range    Pro-BNP 2,232 (H) <300 pg/mL    BNP Interpretation Pro-BNP Reference Range:    Troponin   Result Value Ref Range    Troponin, High Sensitivity 150 (HH) 0 - 14 ng/L    Troponin T NOT REPORTED <0.03 ng/mL    Troponin Interp NOT REPORTED    SPECIMEN REJECTION   Result Value Ref Range    Specimen Source . BLOOD     Ordered Test ESTRELLA     Reason for Rejection       Unable to perform testing: Specimen age beyond stability limit.    - NOT REPORTED    Hemoglobin and hematocrit, blood   Result Value Ref Range    POC Hemoglobin 20.3 (H) 12.0 - 16.0 g/dL    POC Hematocrit 60 (H) 36 - 46 %   SODIUM (POC)   Result Value Ref Range    POC Sodium 147 (H) 138 - 146 mmol/L   POTASSIUM (POC)   Result Value Ref Range    POC Potassium 3.8 3.5 - 4.5 mmol/L   CHLORIDE (POC)   Result Value Ref Range    POC Chloride 107 98 - 107 mmol/L   CALCIUM, IONIC (POC) Quant 2.23 mg/L FEU   Ammonia   Result Value Ref Range    Ammonia 42 11 - 51 umol/L   Microscopic Urinalysis   Result Value Ref Range    -          WBC, UA 10 TO 20 0 - 5 /HPF    RBC, UA 5 TO 10 0 - 2 /HPF    Casts UA 10 TO 20 0 - 2 /LPF    Casts UA HYALINE 0 - 2 /LPF    Crystals, UA NOT REPORTED None /HPF    Epithelial Cells UA 5 TO 10 0 - 5 /HPF    Renal Epithelial, UA NOT REPORTED 0 /HPF    Bacteria, UA NOT REPORTED None    Mucus, UA NOT REPORTED None    Trichomonas, UA NOT REPORTED None    Amorphous, UA 1+ (A) None    Other Observations UA NOT REPORTED NOT REQ.     Yeast, UA NOT REPORTED None   BASIC METABOLIC PANEL   Result Value Ref Range    Glucose 106 (H) 70 - 99 mg/dL    BUN 96 (HH) 6 - 20 mg/dL    CREATININE 2.91 (H) 0.50 - 0.90 mg/dL    Bun/Cre Ratio NOT REPORTED 9 - 20    Calcium 8.0 (L) 8.6 - 10.4 mg/dL    Sodium 149 (H) 135 - 144 mmol/L    Potassium 2.8 (LL) 3.7 - 5.3 mmol/L    Chloride 111 (H) 98 - 107 mmol/L    CO2 21 20 - 31 mmol/L    Anion Gap 17 9 - 17 mmol/L    GFR Non-African American 19 (L) >60 mL/min    GFR  23 (L) >60 mL/min    GFR Comment          GFR Staging NOT REPORTED    Venous Blood Gas, POC   Result Value Ref Range    pH, Guy 7.452 (H) 7.320 - 7.430    pCO2, Guy 39.6 (L) 41.0 - 51.0 mm Hg    pO2, Guy 30.4 30.0 - 50.0 mm Hg    HCO3, Venous 27.7 22.0 - 29.0 mmol/L    Total CO2, Venous 29 23.0 - 30.0 mmol/L    Negative Base Excess, Guy NOT REPORTED 0.0 - 2.0    Positive Base Excess, Guy 4 (H) 0.0 - 3.0    O2 Sat, Guy 62 60.0 - 85.0 %    O2 Device/Flow/% NOT REPORTED     Huber Test NOT REPORTED     Sample Site NOT REPORTED     Mode NOT REPORTED     FIO2 NOT REPORTED     Pt Temp NOT REPORTED     POC pH Temp NOT REPORTED     POC pCO2 Temp NOT REPORTED mm Hg    POC pO2 Temp NOT REPORTED mm Hg   Creatinine W/GFR Point of Care   Result Value Ref Range    POC Creatinine 4.72 (H) 0.51 - 1.19 mg/dL    GFR Comment 13 (L) >60 mL/min    GFR Non- 11 (L) >60 mL/min    GFR Comment         Lactic Acid, POC   Result Value Ref Range    POC Lactic Acid 2.66 (H) 0.56 - 1.39 mmol/L   POCT Glucose   Result Value Ref Range    POC Glucose 127 (H) 74 - 100 mg/dL   Anion Gap (Calc) POC   Result Value Ref Range    Anion Gap 12 7 - 16 mmol/L   POCT Glucose   Result Value Ref Range    Glucose 170 mg/dL    QC OK? Yes    POC Glucose Fingerstick   Result Value Ref Range    POC Glucose 170 (H) 65 - 105 mg/dL   EKG 12 Lead   Result Value Ref Range    Ventricular Rate 116 BPM    Atrial Rate 116 BPM    P-R Interval 122 ms    QRS Duration 86 ms    Q-T Interval 358 ms    QTc Calculation (Bazett) 497 ms    P Axis 77 degrees    R Axis 78 degrees    T Axis -89 degrees       IMPRESSION: 65-year-old lady incarcerated, brought to ED by EMS due to altered mental status, emesis and low O2 sat at 70%. Per EMS, when they found patient, she was found down laying in a pool of room vomit. Patient was altered in the ED, consistently staring into space and occasionally nodded to questions. Patient poor IV access, central line obtained in the right femoral.   systolic, heart rate 697-026 and tachypneic to 40. Patient satting 92 to 94% on 15 L of nonrebreather. Labs are remarkable for ANTIONE, elevated troponin to 150 and D-dimer of 2.23. Due to concern for PE and ACS, high-dose heparin was started as patient is unable to receive contrast for CT PE. Cardiology and nephrology are consulted. Questionable bilateral pneumonia, however due to possible sepsis, IV Zosyn and Vancomycin were initiated. Potassium and magnesium replaced. 2 L of fluid given in ED, patient shows improvement in mentation. CT chest abdomen pelvis showed pneumomediastinum around esophagus, esophagram was obtained. Patient will be transferred out for cardiothoracic surgery, as there is concern for Borrheaves or Margaret-Mahoney tears.   Due to patient being incarcerated, patient is not able to go to [facility], where she was originally accepted by CT surgery. Patient now awaiting to go to [facility]'s MICU and for [facility] CT surgery to follow. If patient's esophagram shows pneumomediastinum is from esophageal tear, we will initiate ED to ED transfer to [facility] due to emergent nature of condition. Patient was signed out to Dr. Cheli Arnett at this time. RADIOLOGY:  See ED course below    EKG  EKG Interpretation    Interpreted by emergency department physician    Rhythm: normal sinus   Rate: tachycardia  Axis: normal  Ectopy: none  Conduction: normal  ST Segments: depression in  v2, v3, v4, v5, v6, II, III and aVf  T Waves: non specific changes  Q Waves: none    Clinical Impression: non-specific EKG    E Zunilda Ascencio      All EKG's are interpreted by the Emergency Department Physician who either signs or Co-signs this chart in the absence of a cardiologist.    EMERGENCY DEPARTMENT COURSE:  Central line obtained for vascular access at right femoral.  Will obtain cardiac work-up, septic work-up with ED tox. Will obtain urinalysis and urine drug screen. We will give 1 L of normal saline. We will continue to monitor while awaiting results. ED Course as of Nov 08 0939   Servando Stinson Nov 08, 2020   0307 hCG Qual: NEGATIVE [EM]   6919 Lactic Acid, Sepsis, Whole Blood(!): 2.6 [EM]   0335 WBC(!): 23.0 [EM]   0337 Creatinine(!): 4.14 [EM]   0337 GFR Non-(!): 13 [EM]   0337 Troponin, High Sensitivity(!!): 150 [EM]   3572 CXR No acute cardiopulmonary process. [EM]   0341 Potassium(!): 3.6 [EM]   2287 BUN(!!): 107 [EM]   0406 Myoglobin(!): 521 [EM]   0422 Spoke with nephrologist, nephrologist will continue to follow. Patient stable with potassium at 3.6. However not making any urine as no return on Stuart insertion.    [EM]   0424 Spoken to cardiologist, will keep him updated regarding repeat troponins and EKGs. We will continue to follow.     [EM]   0789 Patient tolerating nasal cannula 6 L, satting at 93%, no longer tachycardic at 80    [EM]   0434 Lactic Acid, Sepsis, Whole Blood: 1.9 [EM]   0439 Patient saturation dropped down to 88% on 6 L nasal cannula. Readministered 15 L nonrebreather. We will continue to monitor.    [EM]   9185 Troponin, High Sensitivity(!!): 134 [EM]   0500 Bedside ultrasound performed, Stuart was not in urethra. Bladder was full. Will place Stuart in urethra and collecting urine.    [EM]   7451 D-Dimer, Quant: 2.23 [EM]   0708 Patient started on high-dose heparin due to elevated dimer, tachycardic and tachypneic with low O2 sats. D-dimer was elevated at 2.23. Patient has ANTIONE cannot receive contrast.  Patient also has troponin of 150 and a repeat troponin of 135 with ST depressions. Heparin will also cover for ACS. [EM]   0784 CT chest, abdomen and pelvis without contrast obtained. Patient was complaining of right upper quadrant pain pain. Gallbladder not visible on ultrasound. [EM]   3410 Cocaine Metabolite, Urine(!): POSITIVE [EM]   4608 Cannabinoid Scrn, Ur(!): POSITIVE [EM]   500 [facility] radiology called to report there is air in the mediastinum and early signs of pneumonia bilaterally. Will consult cardiothoracic surgery. [EM]   0826 CT  1. Multifocal bilateral ground-glass and nodular pulmonary opacities are  most suggestive of an infectious/inflammatory process.     2.  Small amount of pneumomediastinum in the posterior mediastinum which  surrounds the esophagus. This is of indeterminate etiology.     3. No acute abnormality in the abdomen or pelvis. Normal caliber bowel and  normal appendix. No obstructive uropathy.    [EM]   0745 Creatinine(!): 2.91 [EM]   0745 Potassium(!!): 2.8 [EM]   0746 BUN(!!): 96 [EM]   0816 Will hold esophagram as patient is n.p.o.    [EM]   3261 We will continue with esophagram.  Patient accepted to [facility] MIC[facility], CT on board. Will await esophagram to determine whether or not ED to ED transfer is necessary.   [facility] CT surgery excepted, however due to patient being incarcerated and [facility]'s rules, patient is not able to cross state lines. [EM]      ED Course User Index  [EM] Yung Stewart MD      PROCEDURES:  Central line insertion, right femoral     CONSULTS:  IP CONSULT TO NEPHROLOGY  IP CONSULT TO CARDIOLOGY  IP CONSULT TO CARDIOTHORACIC SURGERY    CRITICAL CARE:  Please see attending note    FINAL IMPRESSION      1. Acute kidney injury ([facility] [address]    2. Altered mental status, unspecified altered mental status type    3. Pneumomediastinum ([facility] [address]          DISPOSITION / PLAN     DISPOSITION  Decision to transfer      PATIENT REFERRED TO:  No follow-up provider specified.     DISCHARGE MEDICATIONS:  New Prescriptions    No medications on file       Yung Stewart MD  Emergency Medicine Resident    (Please note that portions of thisnote were completed with a voice recognition program.  Efforts were made to edit the dictations but occasionally words are mis-transcribed.)        Yung Stewart MD  Resident  11/08/20 9103

## 2020-11-08 NOTE — ED PROVIDER NOTES
[facility] [facility]  Emergency Department  Emergency Medicine Resident Sign-out     Care of Javier Gibson was assumed from Dr. Milagro Scott and is being seen for Altered Mental Status and Withdrawal  .  The patient's initial evaluation and plan have been discussed with the prior provider who initially evaluated the patient. EMERGENCY DEPARTMENT COURSE / MEDICAL DECISION MAKING:       MEDICATIONS GIVEN:  Orders Placed This Encounter   Medications    0.9 % sodium chloride bolus    piperacillin-tazobactam (ZOSYN) 4.5 g in dextrose 5 % 100 mL IVPB (mini-bag)     Order Specific Question:   Antimicrobial Indications     Answer: Other     Order Specific Question:   Other Abx Indication     Answer:   Sepsis    vancomycin (VANCOCIN) 1000 mg in dextrose 5% 200 mL IVPB     Order Specific Question:   Antimicrobial Indications     Answer:    Other     Order Specific Question:   Other Abx Indication     Answer:   Sepsis    heparin (porcine) injection 4,720 Units    heparin (porcine) injection 4,720 Units    heparin (porcine) injection 2,360 Units    heparin 25,000 units in dextrose 5% 250 mL infusion    0.9 % sodium chloride bolus    potassium chloride 10 mEq/100 mL IVPB (Peripheral Line)    magnesium sulfate 1 g in dextrose 5% 100 mL IVPB    diatrizoate meglumine-sodium (GASTROGRAFIN) 66-10 % solution 50 mL    potassium chloride 10 mEq/100 mL IVPB (Peripheral Line)    propofol injection    propofol 1000 MG/100ML injection     BARBIE HAMPTON: cabinet override    etomidate (AMIDATE) 2 MG/ML injection     ZEINA HOLLAND: cabinet override    fentaNYL 20 mcg/mL Infusion       LABS / RADIOLOGY:     Labs Reviewed   CBC WITH AUTO DIFFERENTIAL - Abnormal; Notable for the following components:       Result Value    WBC 23.0 (*)     RBC 6.20 (*)     MCV 76.0 (*)     MCH 24.0 (*)     RDW 17.3 (*)     Seg Neutrophils 87 (*)     Lymphocytes 5 (*)     Monocytes 8 (*)     Eosinophils % 0 (*)     Segs Absolute Abnormal; Notable for the following components:    POC Ionized Calcium 1.01 (*)     All other components within normal limits   TROPONIN - Abnormal; Notable for the following components:    Troponin, High Sensitivity 134 (*)     All other components within normal limits   MYOGLOBIN, SERUM - Abnormal; Notable for the following components:    Myoglobin 521 (*)     All other components within normal limits   APTT - Abnormal; Notable for the following components:    PTT 67.3 (*)     All other components within normal limits   MICROSCOPIC URINALYSIS - Abnormal; Notable for the following components:    Amorphous, UA 1+ (*)     All other components within normal limits   BASIC METABOLIC PANEL - Abnormal; Notable for the following components:    Glucose 106 (*)     BUN 96 (*)     CREATININE 2.91 (*)     Calcium 8.0 (*)     Sodium 149 (*)     Potassium 2.8 (*)     Chloride 111 (*)     GFR Non- 19 (*)     GFR  23 (*)     All other components within normal limits   VENOUS BLOOD GAS, POINT OF CARE - Abnormal; Notable for the following components:    pH, Guy 7.452 (*)     pCO2, Guy 39.6 (*)     Positive Base Excess, Guy 4 (*)     All other components within normal limits   CREATININE W/GFR POINT OF CARE - Abnormal; Notable for the following components:    POC Creatinine 4.72 (*)     GFR Comment 13 (*)     GFR Non- 11 (*)     All other components within normal limits   LACTIC ACID,POINT OF CARE - Abnormal; Notable for the following components:    POC Lactic Acid 2.66 (*)     All other components within normal limits   POCT GLUCOSE - Abnormal; Notable for the following components:    POC Glucose 127 (*)     All other components within normal limits   POC GLUCOSE FINGERSTICK - Abnormal; Notable for the following components:    POC Glucose 170 (*)     All other components within normal limits   POCT GLUCOSE - Normal   CULTURE, BLOOD 1   CULTURE, BLOOD 1   CULTURE, URINE   HCG, SERUM, QUALITATIVE   LACTATE, SEPSIS   SPECIMEN REJECTION   POTASSIUM (POC)   CHLORIDE (POC)   COVID-19   PROTIME-INR   APTT   BLOOD GAS, VENOUS   IMMATURE PLATELET FRACTION   CK   D-DIMER, QUANTITATIVE   AMMONIA   PREVIOUS SPECIMEN   APTT   POTASSIUM   APTT   BLOOD GAS, ARTERIAL   BASIC METABOLIC PANEL   CBC WITH AUTO DIFFERENTIAL   ANION GAP (CALC) POC       Fl Esophagram    Result Date: 11/8/2020  EXAMINATION: SINGLE CONTRAST ESOPHAGRAM 11/8/2020 9:17 am TECHNIQUE: Single contrast esophagram was performed with barium and Gastrografin contrast. FLUOROSCOPY DOSE AND TYPE OR TIME AND EXPOSURES: 32 images 1.8 Gy cm 2 1.4 minutes COMPARISON: None HISTORY: ORDERING SYSTEM PROVIDED HISTORY: Air in mediastinum, emesis ++ TECHNOLOGIST PROVIDED HISTORY: Air in mediastinum, emesis ++ Acuity: Acute Type of Exam: Initial FINDINGS: The esophagus is of normal caliber and demonstrates normal motility. There are no mucosal abnormalities seen. No extraluminal contrast extension to suggest leak. No evidence for esophageal leak. Xr Chest Portable    Result Date: 11/8/2020  EXAMINATION: ONE XRAY VIEW OF THE CHEST 11/8/2020 3:01 am COMPARISON: None. HISTORY: ORDERING SYSTEM PROVIDED HISTORY: SOB TECHNOLOGIST PROVIDED HISTORY: SOB Reason for Exam: portable supine/ c/o sob and AMS Acuity: Acute Type of Exam: Initial FINDINGS: Frontal portable view of the chest.  Normal lung volume. No focal airspace disease. Normal pulmonary vasculature. No pleural effusion or pneumothorax. Normal cardiomediastinal silhouette and great vessels. No acute osseous abnormality. No acute cardiopulmonary process. Ct Chest Abdomen Pelvis Wo Contrast    Result Date: 11/8/2020  EXAMINATION: CT OF THE CHEST, ABDOMEN, AND PELVIS WITHOUT CONTRAST 11/8/2020 5:52 am TECHNIQUE: CT of the chest, abdomen and pelvis was performed without the administration of intravenous contrast. Multiplanar reformatted images are provided for review.  Dose modulation, iterative reconstruction, and/or weight based adjustment of the mA/kV was utilized to reduce the radiation dose to as low as reasonably achievable. COMPARISON: None HISTORY: ORDERING SYSTEM PROVIDED HISTORY: RUQ pain, ANTIONE TECHNOLOGIST PROVIDED HISTORY: RUQ pain, ANTIONE Is the patient pregnant?->No Reason for Exam: alms, found down Acuity: Unknown Type of Exam: Unknown FINDINGS: Chest: Mediastinum: Normal heart and pericardium. Normal thoracic aorta. Normal caliber main pulmonary artery. No intrathoracic lymphadenopathy. Small amount of pneumomediastinum in the posterior mediastinum surrounds the esophagus. Visualized thyroid gland is normal. Lungs/pleura: Respiratory motion. Multifocal bilateral ground-glass and nodular opacities, left greater than right. No endoluminal lesion. No pleural effusion or pneumothorax. Soft Tissues/Bones: No acute abnormality. Abdomen/Pelvis: Life-support devices: Right femoral central venous catheter with tip in the right common iliac vein. Stuart catheter in the urinary bladder Liver: Normal. Gallbladder and Bile Ducts: Normal. Spleen: Normal. Adrenal Glands: Normal. Pancreas: Normal. Genitourinary: No acute abnormality. No urinary stones or hydronephrosis. Bowel: Normal caliber bowel. Normal appendix. No significant diverticular disease. Vasculature: Normal. Bones and Soft Tissues: No acute abnormality. Retroperitoneum/Mesentery: No intraperitoneal free air, ascites or fluid collection. No lymphadenopathy in the abdomen or pelvis. 1.  Multifocal bilateral ground-glass and nodular pulmonary opacities are most suggestive of an infectious/inflammatory process. 2.  Small amount of pneumomediastinum in the posterior mediastinum which surrounds the esophagus. This is of indeterminate etiology. 3.  No acute abnormality in the abdomen or pelvis. Normal caliber bowel and normal appendix. No obstructive uropathy.  Critical results were called by Dr. Marylouise Severs Sessions to [address] [place] [address] on 11/8/2020 at 06:52. RECENT VITALS:     Temp: 97.9 °F (36.6 °C),  Pulse: 70, Resp: 18, BP: 98/64, SpO2: 100 %    This patient is a 31 y.o. Female after being found down in a pool of emesis. The patient was altered. Patient is presenting from assisted and history of emesis duration unclear. The patient was initially 70% prior to arrival.  The patient currently on a nonrebreather saturating in the mid 90s. Her work-up significant for mediastinal air, ANTIONE and hypokalemia. Patient also had groundglass opacities on CT chest and she was started on vancomycin and Zosyn. Covid swab negative. The patient had ketones in her urine and creatinine elevated to 4. 14. She was given 2 L of fluid. Potassium was replaced with 40 mEq IV. Central line placed by preceding resident. The patient was reevaluated multiple times progressively more confused. On nonrebreather mask and saturating in the low 90s. Patient does have increased work of breathing. Repeat chest x-ray showing increased opacities. No pneumothorax. Unable to see the mediastinal air. The patient's respiratory rate in the low 40s. Continuing to require nonrebreather oxygen saturations in the low 90s. Patient is able to nod yes or no to some questions. Discussed with patient preceding with intubation. Patient was able to nod her head yes. See intubation note below. Started on propofol for sedation. Patient was transferred to [facility] via flight in stable condition. ED Course as of Nov 08 1528   [facility] Nov 08, 2020   0307 hCG Qual: NEGATIVE [EM]   7242 Lactic Acid, Sepsis, Whole Blood(!): 2.6 [EM]   0335 WBC(!): 23.0 [EM]   0337 Creatinine(!): 4.14 [EM]   0337 GFR Non-(!): 13 [EM]   0337 Troponin, High Sensitivity(!!): 150 [EM]   3389 CXR No acute cardiopulmonary process.     [EM]   7896 Potassium(!): 3.6 [EM]   4506 BUN(!!): 107 [EM]   0406 Myoglobin(!): 521 [EM]   0422 Spoke with nephrologist, nephrologist will PROCEDURE:  A timeout was initiated by the bedside nurse and was confirmed by those present. The patient was placed in the appropriate position. Cricoid pressure was utilized. Intubation was performed by direct laryngoscopy using a laryngoscope and a 7.5 cuffed endotracheal tube. The patient desaturated on the first two attempts by another resident to 89% and she required bagging. There were copious secretions in the airway. Successful intubation on third attempt. The cuff was then inflated and the tube was secured appropriately at a distance of 25 cm to the dental ridge. Initial confirmation of placement included bilateral breath sounds, an end tidal CO2 detector, absence of sounds over the stomach, tube fogging and adequate chest rise. A chest x-ray to verify correct placement of the tube was placed deep and retracted 2 cm. The patient tolerated the procedure well. COMPLICATIONS:  None     Ailyn Bruno MD  3:30 PM, 11/8/20      OUTSTANDING TASKS / RECOMMENDATIONS:    1. Transferred to [facility]:     1. Acute kidney injury ([address]    2. Altered mental status, unspecified altered mental status type    3. [facility]        DISPOSITION:         DISPOSITION:  []  Discharge   [x]  Transfer -    []  Admission -     []  Against Medical Advice   []  Eloped   FOLLOW-UP: No follow-up provider specified.    DISCHARGE MEDICATIONS: New Prescriptions    No medications on file           Ailyn Bruno MD  Emergency Medicine Resident  32 Nhi Silva MD  Resident  11/08/20 1534       Ailyn Bruno MD  Resident  11/08/20 503 Katharine IRENE MD  Resident  11/08/20 1163

## 2020-11-08 NOTE — ED NOTES
Pt to ED via EMS from [address] [place] [address]  Pt was found lying in prone position by staff RN, minimally responsive and covered in vomit  Per senior living RN, pt initial SPO2 was in the 70s, pt was placed on nasal canula, SPO2 to 90s  Pt arrived to ED on nasal canula with SPO2 at 94%, Pt alert to name, but unable to verbalize any answers, just shaking head to confirm her name  Pt has audible crackles and a weak cough, pt unable to clear vomit from mouth   Pt not complaining of any pain at the present time  Pt placed on nasal canula, the placed on nonrebreather due to SPO2 at 88% on NC  Pt placed on cardiac monitor, ekg was obtained  IV access unable to be obtained via ultrasound, Dr. Sakshi Yeager and Dr. Lauren Britton at bedside - agree to place central line             Alberto Pineda RN  11/08/20 2895

## 2020-11-08 NOTE — ED NOTES
Bed: 08  Expected date:   Expected time:   Means of arrival:   Comments:  LS1     Aleksandra Barrientos RN  11/08/20 5506

## 2020-11-08 NOTE — ED NOTES
Dr Yuriy Guillen, Cardiology at bedside. Writer present, outside room with Attending ED physician. Guards remain at bedside. VS as charted. Patient will not leave mask on face.   Patient swallow studiy completed by Marilu muhammad to swallow     Jayme Duarte RN  11/08/20 3380

## 2020-11-08 NOTE — ED NOTES
Dr. Sophy Watson notified of urine output.  States nephrology is ok with low output as long as potassium remains WNL     Irwin Velasquez RN  11/08/20 4899

## 2020-11-08 NOTE — ED NOTES
Report called Kerwin Newberry at the [facility].       Yoon Escalera RN  11/08/20 8802

## 2020-11-08 NOTE — ED PROVIDER NOTES
[facility] ED  Emergency Department  Faculty Attestation       I performed a history and physical examination of the patient and discussed management with the resident. I reviewed the residents note and agree with the documented findings including all diagnostic interpretations and plan of care. Any areas of disagreement are noted on the chart. I was personally present for the key portions of any procedures. I have documented in the chart those procedures where I was not present during the key portions. I have reviewed the emergency nurses triage note. I agree with the chief complaint, past medical history, past surgical history, allergies, medications, social and family history as documented unless otherwise noted below. Documentation of the HPI, Physical Exam and Medical Decision Making performed by julianneibconnor is based on my personal performance of the HPI, PE and MDM. For Physician Assistant/ Nurse Practitioner cases/documentation I have personally evaluated this patient and have completed at least one if not all key elements of the E/M (history, physical exam, and MDM). Additional findings are as noted. Pertinent Comments     Primary Care Physician: No primary care provider on file. ED Triage Vitals   BP Temp Temp Source Pulse Resp SpO2 Height Weight   11/08/20 0149 11/08/20 0202 11/08/20 0202 11/08/20 0149 11/08/20 0149 11/08/20 0149 -- 11/08/20 0343   108/89 97.9 °F (36.6 °C) Oral 138 (!) 39 92 %  130 lb (59 kg)        History/Physical: This is a 31 y.o. female who presents to the Emergency Department with complaint of who presents with altered mental status, nausea, vomiting, and withdrawal.  Patient's been incarcerated for approximate 4 to 5 days. She had been reportedly vomiting since 1 PM.  Patient is unable to answer many questions at this time as she is obtunded. Does have known history of drug use including heroin use.     On exam, she is thin appearing appears to be in mild acute distress. Normocephalic. Extremely dry mucous membranes. Heart sounds tachycardic but regular. She has increased respirations and some crackles throughout. Breathing at a rate of 35, and is on nonrebreather. Abdomen soft nontender. No obvious extremity injuries. Patient does withdraw to pain all extremities, does look around. Not currently answering questions. MDM/Plan: Altered mental status. Drug overdose versus dehydration versus septic. Full septic work-up, IV fluids, reevaluate. Unable to get IV access initially. Therefore central line placed. See procedure note. Patient has an NSTEMI as well as an ANTIONE, elevated troponin is likely secondary to renal dysfunction. Stuart placed, however was in the wrong position. Ultrasound performed and seen that this was in the vaginal canal.  Was replaced, and did have some mild urine output. Much more alert and oriented at this time, complaining of abdominal pain. Does have abdominal tenderness. Unable to visualize gallbladder on ultrasound. CT scan chest and abdomen and pelvis. Covid negative. Admit    EKG Interpretation    Interpreted by emergency department physician    Clinical Impression: sinus tachy at 143 nonspecific findings. CRITICAL CARE: There was significant risk of life threatening deterioration of patient's condition requiring my direct management. Critical care time 20 minutes, excluding any documented procedures.       Bhavna Keller MD  Attending Emergency Physician        Bhavna Keller MD  11/08/20 9434

## 2020-11-08 NOTE — PROCEDURES
PROCEDURE NOTE - CENTRAL VENOUS LINE PLACEMENT    PATIENT NAME: Sarah Cash  MEDICAL RECORD NO. [id number]  DATE: 11/8/2020  ATTENDING PHYSICIAN: Dr Artem Macedo DIAGNOSIS:  vascular access  POSTOPERATIVE DIAGNOSIS:  Same  PROCEDURE PERFORMED:  Right Femoral Vein Central Line Insertion  PERFORMING PHYSICIAN: Leonard Matos MD  ANESTHESIA:  Local utilizing 1% lidocaine  ESTIMATED BLOOD LOSS:  Less than 25 ml  COMPLICATIONS:  None immediately appreciated. DISCUSSION:  Sarah Cash is a 31y.o.-year-old female who requires central IV access vascular access. The history and physical examination were reviewed and confirmed. CONSENT: The patient provided verbal consent for this procedure. PROCEDURE:  A timeout was initiated by the bedside nurse and was confirmed by those present. The patient was placed in a supine position. The skin overlying the Right Femoral Vein was prepped with chlorhexadine and draped in sterile fashion. The skin was infiltrated with local anesthetic. The vessel and surrounding anatomy was visualized using ultrasound. Through the anesthetized region, the introducer needle was inserted into the femoral vein returning dark red non pulsatile blood. A guidewire was placed through the center of the needle with no resistance. Ultrasound confirmed presence of wire in the vein. A small incision made in the skin with a #11 scalpel blade. The dilator was inserted into the skin and vein over guidewire using Seldinger technique. The dilator was then removed and the 20F 27cm catheter was placed in the vein over the guidewire using Seldinger technique. The guidewire was then removed and all ports aspirated and flushed appropriately. The catheter then secured using silk suture and a temporary sterile dressing was applied. No immediate complication was evident. All sponge, instrument and needle counts were correct at the completion of the procedure.     The patient tolerated the procedure well with no immediate complication evident.      Bisi Treadwell MD  2:46 AM, 11/8/20

## 2020-11-08 NOTE — ED NOTES
Report received from [facility], Coordinator. Patient alert in bed. High flow in place. NAD noted.       Nick Osuna RN  11/08/20 0088

## 2020-11-08 NOTE — ED PROVIDER NOTES
[facility] HANDOFF       Handoff taken on the following patient from prior Attending Physician:  Pt Name: Gus Sánchez  PCP:  No primary care provider on file. Attestation  I was available and discussed any additional care issues that arose and coordinated the management plans with the resident(s) caring for the patient during my duty period. Any areas of disagreement with resident's documentation of care or procedures are noted on the chart. I was personally present for the key portions of any/all procedures during my duty period. I have documented in the chart those procedures where I was not present during the key portions. CHIEF COMPLAINT       Chief Complaint   Patient presents with    Altered Mental Status    Withdrawal         CURRENT MEDICATIONS     Previous Medications  Previous Medications    No medications on file       Encounter Medications  Orders Placed This Encounter   Medications    0.9 % sodium chloride bolus    piperacillin-tazobactam (ZOSYN) 4.5 g in dextrose 5 % 100 mL IVPB (mini-bag)     Order Specific Question:   Antimicrobial Indications     Answer: Other     Order Specific Question:   Other Abx Indication     Answer:   Sepsis    vancomycin (VANCOCIN) 1000 mg in dextrose 5% 200 mL IVPB     Order Specific Question:   Antimicrobial Indications     Answer: Other     Order Specific Question:   Other Abx Indication     Answer:   Sepsis    heparin (porcine) injection 4,720 Units    heparin (porcine) injection 4,720 Units    heparin (porcine) injection 2,360 Units    heparin 25,000 units in dextrose 5% 250 mL infusion    0.9 % sodium chloride bolus    potassium chloride 10 mEq/100 mL IVPB (Peripheral Line)    magnesium sulfate 2 g in 50 mL IVPB premix       ALLERGIES     has No Known Allergies.       RECENT VITALS:   Temp: 97.9 °F (36.6 °C),  Pulse: 70, Resp: (!) 33, BP: 117/89    RADIOLOGY:   CT CHEST ABDOMEN PELVIS WO CONTRAST   Final Result   1. Multifocal bilateral ground-glass and nodular pulmonary opacities are   most suggestive of an infectious/inflammatory process. 2.  Small amount of pneumomediastinum in the posterior mediastinum which   surrounds the esophagus. This is of indeterminate etiology. 3.  No acute abnormality in the abdomen or pelvis. Normal caliber bowel and   normal appendix. No obstructive uropathy. Critical results were called by Dr. Foote Sessions to [address] [place] [address] on   11/8/2020 at 06:52. XR CHEST PORTABLE   Final Result   No acute cardiopulmonary process.          FL ESOPHAGRAM    (Results Pending)       LABS:  Labs Reviewed   CBC WITH AUTO DIFFERENTIAL - Abnormal; Notable for the following components:       Result Value    WBC 23.0 (*)     RBC 6.20 (*)     MCV 76.0 (*)     MCH 24.0 (*)     RDW 17.3 (*)     Seg Neutrophils 87 (*)     Lymphocytes 5 (*)     Monocytes 8 (*)     Eosinophils % 0 (*)     Segs Absolute 20.01 (*)     Absolute Mono # 1.84 (*)     All other components within normal limits   COMPREHENSIVE METABOLIC PANEL - Abnormal; Notable for the following components:    Glucose 115 (*)      (*)     CREATININE 4.14 (*)     Calcium 10.5 (*)     Sodium 145 (*)     Potassium 3.6 (*)     Chloride 94 (*)     Anion Gap 29 (*)     ALT 37 (*)     AST 56 (*)     Total Protein 9.3 (*)     Alb 5.3 (*)     GFR Non- 13 (*)     GFR  16 (*)     All other components within normal limits   URINE RT REFLEX TO CULTURE - Abnormal; Notable for the following components:    Color, UA DARK YELLOW (*)     Turbidity UA TURBID (*)     Ketones, Urine SMALL (*)     Urine Hgb MODERATE (*)     Protein, UA 3+ (*)     Leukocyte Esterase, Urine TRACE (*)     All other components within normal limits   URINE DRUG SCREEN - Abnormal; Notable for the following components:    Cocaine Metabolite, Urine POSITIVE (*)     Cannabinoid Scrn, Ur POSITIVE (*)     All other components within normal limits   TOX SCR, BLD, ED - Abnormal; Notable for the following components:    Acetaminophen Level <5 (*)     Salicylate Lvl <1 (*)     All other components within normal limits   LACTATE, SEPSIS - Abnormal; Notable for the following components:    Lactic Acid, Sepsis, Whole Blood 2.6 (*)     All other components within normal limits   BRAIN NATRIURETIC PEPTIDE - Abnormal; Notable for the following components:    Pro-BNP 2,232 (*)     All other components within normal limits   TROPONIN - Abnormal; Notable for the following components:    Troponin, High Sensitivity 150 (*)     All other components within normal limits   HGB/HCT - Abnormal; Notable for the following components:    POC Hemoglobin 20.3 (*)     POC Hematocrit 60 (*)     All other components within normal limits   SODIUM (POC) - Abnormal; Notable for the following components:    POC Sodium 147 (*)     All other components within normal limits   CALCIUM, IONIC (POC) - Abnormal; Notable for the following components:    POC Ionized Calcium 1.01 (*)     All other components within normal limits   TROPONIN - Abnormal; Notable for the following components:    Troponin, High Sensitivity 134 (*)     All other components within normal limits   MYOGLOBIN, SERUM - Abnormal; Notable for the following components:    Myoglobin 521 (*)     All other components within normal limits   MICROSCOPIC URINALYSIS - Abnormal; Notable for the following components:    Amorphous, UA 1+ (*)     All other components within normal limits   BASIC METABOLIC PANEL - Abnormal; Notable for the following components:    Glucose 106 (*)     BUN 96 (*)     CREATININE 2.91 (*)     Calcium 8.0 (*)     Sodium 149 (*)     Potassium 2.8 (*)     Chloride 111 (*)     GFR Non- 19 (*)     GFR  23 (*)     All other components within normal limits   VENOUS BLOOD GAS, POINT OF CARE - Abnormal; Notable for the following components:    pH, Guy 7.452 (*)     pCO2, Guy 39.6 (*)     Positive Base Excess, Guy 4 (*)     All other components within normal limits   CREATININE W/GFR POINT OF CARE - Abnormal; Notable for the following components:    POC Creatinine 4.72 (*)     GFR Comment 13 (*)     GFR Non- 11 (*)     All other components within normal limits   LACTIC ACID,POINT OF CARE - Abnormal; Notable for the following components:    POC Lactic Acid 2.66 (*)     All other components within normal limits   POCT GLUCOSE - Abnormal; Notable for the following components:    POC Glucose 127 (*)     All other components within normal limits   POC GLUCOSE FINGERSTICK - Abnormal; Notable for the following components:    POC Glucose 170 (*)     All other components within normal limits   POCT GLUCOSE - Normal   CULTURE, BLOOD 1   CULTURE, BLOOD 1   CULTURE, URINE   HCG, SERUM, QUALITATIVE   LACTATE, SEPSIS   SPECIMEN REJECTION   POTASSIUM (POC)   CHLORIDE (POC)   COVID-19   PROTIME-INR   APTT   BLOOD GAS, VENOUS   IMMATURE PLATELET FRACTION   CK   D-DIMER, QUANTITATIVE   AMMONIA   PREVIOUS SPECIMEN   APTT   APTT   ANION GAP (CALC) POC           PLAN/ TASKS OUTSTANDING     This patient is a 31 y.o. Female. 30-year-old female brought in from detention after vomiting approximately 6 hours prior to arrival.  She is very thin, tachycardic and tachypneic on arrival with an oxygen saturation of 91% on nonrebreather. She has elevated dimer, elevated troponin, potassium of 2.8, and a creatinine of 4. She was started on high-dose heparin, treated with Vanco and Zosyn, given potassium repletion, and had a CT chest abdomen pelvis without contrast    Chest shows multifocal peripheral infiltrates concerning for infection. Also found to have pneumomediastinum surrounding the esophagus. Given her history of vomiting the concern was for rupture esophagus.     Consulting thoracic surgery at this facility it was determined that the procedure cannot be performed here, we are working currently to get the patient transferred elsewhere. 8:31 AM EST  Esophagram ordered, awaiting calls back from multiple facilities for transfer. Attempted to call [facility] however since she is incarcerated she cannot cross state lines. Accepted by [facility], transported by flight crew. Esophogram shows no leak, however thoracic surgery here did not accept.       (Please note that portions of this note were completed with a voice recognition program.  Efforts were made to edit the dictations but occasionally words are mis-transcribed.)    Constance Duverney,, MD  Attending Emergency Physician         Constance Duverney, MD  11/12/20 [address], [place]  12/03/20 1154

## 2020-11-08 NOTE — ED NOTES
Patient released from police custody, coordinator updated. Patient resting comfortably in bed.       Belinda Alicea RN  11/08/20 7035

## 2020-11-09 LAB
CULTURE: ABNORMAL
Lab: ABNORMAL
SPECIMEN DESCRIPTION: ABNORMAL

## 2020-11-11 LAB
EKG ATRIAL RATE: 116 BPM
EKG ATRIAL RATE: 143 BPM
EKG P AXIS: 77 DEGREES
EKG P AXIS: 80 DEGREES
EKG P-R INTERVAL: 122 MS
EKG P-R INTERVAL: 136 MS
EKG Q-T INTERVAL: 348 MS
EKG Q-T INTERVAL: 358 MS
EKG QRS DURATION: 78 MS
EKG QRS DURATION: 86 MS
EKG QTC CALCULATION (BAZETT): 497 MS
EKG QTC CALCULATION (BAZETT): 537 MS
EKG R AXIS: 78 DEGREES
EKG R AXIS: 84 DEGREES
EKG T AXIS: -89 DEGREES
EKG T AXIS: 67 DEGREES
EKG VENTRICULAR RATE: 116 BPM
EKG VENTRICULAR RATE: 143 BPM

## 2020-11-11 PROCEDURE — 93010 ELECTROCARDIOGRAM REPORT: CPT | Performed by: INTERNAL MEDICINE

## 2020-11-14 LAB
CULTURE: NORMAL
CULTURE: NORMAL
Lab: NORMAL
Lab: NORMAL
SPECIMEN DESCRIPTION: NORMAL
SPECIMEN DESCRIPTION: NORMAL

## 2021-04-07 NOTE — ED NOTES
Report given to [facility] NARENDRA Bailey RN  11/08/20 5690 [FreeTextEntry3] : Procedure - Cerumen Removal. \par After informed verbal consent is obtained, cerumen is removed from the right ear canal with a curette and suction.  Normal appearing canal following removal.\par  [FreeTextEntry6] : Flexible scope #27 was used. Right nasal passage with normal inferior, middle and superior turbinates. Nasal passage patent with clear middle meatus and sphenoethmoid recess. Left nasal passage with normal inferior, middle and superior turbinates. Nasal passage was patent with clear middle meatus and sphenoethmoid recess. No mucopurulence or polyps appreciated. Nasopharynx clear.\par \par  [de-identified] : Flexible scope #27 used. Passed through nasal passage and nasopharynx/oropharynx/hypopharynx clear. Supraglottis normal. Glottis with fully mobile vocal cords without lesions or masses. Visualized subglottis clear. Postcricoid area with mild erythema but no edema. No pooling of secretions.\par \par

## 2023-10-24 ENCOUNTER — HOSPITAL ENCOUNTER (EMERGENCY)
Age: 33
Discharge: LEFT BEFORE DISCHARGE | End: 2023-10-24
Attending: EMERGENCY MEDICINE

## 2023-10-24 VITALS
DIASTOLIC BLOOD PRESSURE: 94 MMHG | OXYGEN SATURATION: 96 % | WEIGHT: 125 LBS | BODY MASS INDEX: 22.15 KG/M2 | HEIGHT: 63 IN | RESPIRATION RATE: 12 BRPM | HEART RATE: 70 BPM | SYSTOLIC BLOOD PRESSURE: 134 MMHG

## 2023-10-24 DIAGNOSIS — R10.2 VAGINAL PAIN: Primary | ICD-10-CM

## 2023-10-24 PROCEDURE — 99281 EMR DPT VST MAYX REQ PHY/QHP: CPT

## 2023-10-24 ASSESSMENT — ENCOUNTER SYMPTOMS
EYE REDNESS: 0
SORE THROAT: 0
BACK PAIN: 0
SHORTNESS OF BREATH: 0
EYE PAIN: 0
COUGH: 0
VOMITING: 0
ABDOMINAL PAIN: 0
DIARRHEA: 0

## 2023-10-24 ASSESSMENT — PAIN - FUNCTIONAL ASSESSMENT: PAIN_FUNCTIONAL_ASSESSMENT: 0-10

## 2023-10-24 ASSESSMENT — PAIN SCALES - GENERAL: PAINLEVEL_OUTOF10: 0

## 2023-10-24 NOTE — ED PROVIDER NOTES
[facility]  EMERGENCY MEDICINE     Pt Name: Titus Kimble  MRN: [id number]  [address] [place] 1990  Date of evaluation: 10/24/2023  PCP:    No primary care provider on file. Provider: Terri Sue DO    CHIEF COMPLAINT       Chief Complaint   Patient presents with    Foreign Body in Vagina     \"Vaginal disc\" for 2 days        HISTORY OF PRESENT ILLNESS    Patient is a 42-year-old female that presents with foreign body sensation in the vagina. Patient states she put a vaginal disc in the ER 2 days ago and they are only supposed to be left in for 12 hours. Patient has sensation still in. Patient denies any other symptoms at this time. Triage notes and Nursing notes were reviewed by myself. Any discrepancies are addressed above. PAST MEDICAL HISTORY   History reviewed. No pertinent past medical history. SURGICAL HISTORY       Past Surgical History:   Procedure Laterality Date     SECTION         CURRENT MEDICATIONS       Previous Medications    ACETAMINOPHEN (TYLENOL) 500 MG TABLET    Take 500 mg by mouth every 6 hours as needed for Pain    IBUPROFEN (ADVIL;MOTRIN) 800 MG TABLET    Take 1 tablet by mouth every 8 hours as needed for Pain    UNKNOWN TO PATIENT    Take 1 tablet by mouth daily Birth control tablet once daily       ALLERGIES       Allergies   Allergen Reactions    Hydrocodone-Acetaminophen Itching    Nalbuphine      Other reaction(s): Vomiting       FAMILY HISTORY     History reviewed. No pertinent family history.      SOCIAL HISTORY       Social History     Socioeconomic History    Marital status: Single     Spouse name: None    Number of children: None    Years of education: None    Highest education level: None   Tobacco Use    Smoking status: Some Days     Packs/day: .5     Types: Cigarettes    Smokeless tobacco: Never   Substance and Sexual Activity    Alcohol use: Yes     Comment: social    Drug use: Not Currently     Types: IV     Comment: pt states uses heroin;

## 2024-07-03 ENCOUNTER — APPOINTMENT (OUTPATIENT)
Dept: CT IMAGING | Age: 34
End: 2024-07-03
Payer: MEDICAID

## 2024-07-03 ENCOUNTER — HOSPITAL ENCOUNTER (EMERGENCY)
Age: 34
Discharge: HOME | End: 2024-07-03
Attending: EMERGENCY MEDICINE
Payer: MEDICAID

## 2024-07-03 VITALS
OXYGEN SATURATION: 100 % | HEART RATE: 74 BPM | SYSTOLIC BLOOD PRESSURE: 136 MMHG | DIASTOLIC BLOOD PRESSURE: 98 MMHG | TEMPERATURE: 98.8 F | RESPIRATION RATE: 18 BRPM

## 2024-07-03 DIAGNOSIS — R40.4 TRANSIENT ALTERATION OF AWARENESS: Primary | ICD-10-CM

## 2024-07-03 LAB
AMPHET UR QL SCN: NEGATIVE
ANION GAP SERPL CALCULATED.3IONS-SCNC: 11 MMOL/L (ref 9–16)
APAP SERPL-MCNC: <5 UG/ML (ref 10–30)
BARBITURATES UR QL SCN: NEGATIVE
BASOPHILS # BLD: 0.03 K/UL (ref 0–0.2)
BASOPHILS NFR BLD: 0 % (ref 0–2)
BENZODIAZ UR QL: NEGATIVE
BUN SERPL-MCNC: 14 MG/DL (ref 6–20)
CALCIUM SERPL-MCNC: 8.4 MG/DL (ref 8.6–10.4)
CANNABINOIDS UR QL SCN: NEGATIVE
CHLORIDE SERPL-SCNC: 103 MMOL/L (ref 98–107)
CO2 SERPL-SCNC: 26 MMOL/L (ref 20–31)
COCAINE UR QL SCN: POSITIVE
CREAT SERPL-MCNC: 0.5 MG/DL (ref 0.5–0.9)
EOSINOPHIL # BLD: 0.11 K/UL (ref 0–0.44)
EOSINOPHILS RELATIVE PERCENT: 1 % (ref 1–4)
ERYTHROCYTE [DISTWIDTH] IN BLOOD BY AUTOMATED COUNT: 15.2 % (ref 11.8–14.4)
ETHANOL PERCENT: <0.01 %
ETHANOLAMINE SERPL-MCNC: <10 MG/DL (ref 0–0.08)
FENTANYL UR QL: NEGATIVE
GFR, ESTIMATED: >90 ML/MIN/1.73M2
GLUCOSE SERPL-MCNC: 111 MG/DL (ref 74–99)
HCG SERPL QL: NEGATIVE
HCT VFR BLD AUTO: 35.1 % (ref 36.3–47.1)
HGB BLD-MCNC: 10.9 G/DL (ref 11.9–15.1)
IMM GRANULOCYTES # BLD AUTO: 0.14 K/UL (ref 0–0.3)
IMM GRANULOCYTES NFR BLD: 1 %
LYMPHOCYTES NFR BLD: 2.08 K/UL (ref 1.1–3.7)
LYMPHOCYTES RELATIVE PERCENT: 21 % (ref 24–43)
MCH RBC QN AUTO: 26.1 PG (ref 25.2–33.5)
MCHC RBC AUTO-ENTMCNC: 31.1 G/DL (ref 28.4–34.8)
MCV RBC AUTO: 84 FL (ref 82.6–102.9)
METHADONE UR QL: NEGATIVE
MONOCYTES NFR BLD: 0.6 K/UL (ref 0.1–1.2)
MONOCYTES NFR BLD: 6 % (ref 3–12)
NEUTROPHILS NFR BLD: 71 % (ref 36–65)
NEUTS SEG NFR BLD: 6.93 K/UL (ref 1.5–8.1)
NRBC BLD-RTO: 0 PER 100 WBC
OPIATES UR QL SCN: POSITIVE
OXYCODONE UR QL SCN: NEGATIVE
PCP UR QL SCN: NEGATIVE
PLATELET # BLD AUTO: 210 K/UL (ref 138–453)
PMV BLD AUTO: 10.7 FL (ref 8.1–13.5)
POTASSIUM SERPL-SCNC: 3.6 MMOL/L (ref 3.7–5.3)
RBC # BLD AUTO: 4.18 M/UL (ref 3.95–5.11)
RBC # BLD: ABNORMAL 10*6/UL
SALICYLATES SERPL-MCNC: <0.5 MG/DL (ref 0–10)
SODIUM SERPL-SCNC: 140 MMOL/L (ref 136–145)
TEST INFORMATION: ABNORMAL
WBC OTHER # BLD: 9.9 K/UL (ref 3.5–11.3)

## 2024-07-03 PROCEDURE — 80143 DRUG ASSAY ACETAMINOPHEN: CPT

## 2024-07-03 PROCEDURE — 80307 DRUG TEST PRSMV CHEM ANLYZR: CPT

## 2024-07-03 PROCEDURE — 84703 CHORIONIC GONADOTROPIN ASSAY: CPT

## 2024-07-03 PROCEDURE — 99284 EMERGENCY DEPT VISIT MOD MDM: CPT

## 2024-07-03 PROCEDURE — 85025 COMPLETE CBC W/AUTO DIFF WBC: CPT

## 2024-07-03 PROCEDURE — 95717 EEG PHYS/QHP 2-12 HR W/O VID: CPT | Performed by: PSYCHIATRY & NEUROLOGY

## 2024-07-03 PROCEDURE — G0480 DRUG TEST DEF 1-7 CLASSES: HCPCS

## 2024-07-03 PROCEDURE — 80179 DRUG ASSAY SALICYLATE: CPT

## 2024-07-03 PROCEDURE — 70450 CT HEAD/BRAIN W/O DYE: CPT

## 2024-07-03 PROCEDURE — 80048 BASIC METABOLIC PNL TOTAL CA: CPT

## 2024-07-03 PROCEDURE — 95705 EEG W/O VID 2-12 HR UNMNTR: CPT

## 2024-07-03 NOTE — DISCHARGE INSTRUCTIONS
You were seen and evaluated for possible seizure activity.  Our workup showed no such seizure activity.  Given your history of seizure-like activity, please follow-up with our neurologist in order to discuss your symptoms, this ER visit, pictures of your head, and for evaluation of possible seizures.

## 2024-07-03 NOTE — ED PROVIDER NOTES
[facility] ED  Emergency Department Encounter  Emergency Medicine Resident     Pt Name:Tabitha Rich  MRN: [id number]  Birthdate 1990  Date of evaluation: 7/3/24  PCP:  No primary care provider on file.  Note Started: 1:28 PM EDT      CHIEF COMPLAINT       Chief Complaint   Patient presents with    Altered Mental Status       HISTORY OF PRESENT ILLNESS  (Location/Symptom, Timing/Onset, Context/Setting, Quality, Duration, Modifying Factors, Severity.)      Tabitha Rich is a 34 y.o. female with reported history of seizures (not taking antiseizure medication) who presents with reported seizure-like activity from intermediate facility.  Seizure was not witnessed and found by nurse at intermediate.  Per EMS, patient found unresponsive at intermediate.  Patient reportedly 2 months pregnant.    PAST MEDICAL / SURGICAL / SOCIAL / FAMILY HISTORY      has no past medical history on file.       has a past surgical history that includes  section.      Social History     Socioeconomic History    Marital status: Single     Spouse name: Not on file    Number of children: Not on file    Years of education: Not on file    Highest education level: Not on file   Occupational History    Not on file   Tobacco Use    Smoking status: Some Days     Current packs/day: 0.50     Types: Cigarettes    Smokeless tobacco: Never   Substance and Sexual Activity    Alcohol use: Yes     Comment: social    Drug use: Not Currently     Types: IV     Comment: pt states uses heroin; last used 1/3/16    Sexual activity: Not on file   Other Topics Concern    Not on file   Social History Narrative    ** Merged History Encounter **          Social Determinants of Health     Financial Resource Strain: Not on file   Food Insecurity: Not on file   Transportation Needs: Not on file   Physical Activity: Not on file   Stress: Not on file   Social Connections: Not on file   Intimate Partner Violence: Not on file   Housing Stability: Not on file    Creatinine 0.5   Est, Glom Filt Rate >90   Calcium 8.4(!) [SP]   1558 TOX SCR, BLD, ED(!):    Acetaminophen Level <5(!)   Ethanol Lvl <10   Ethanol percent <0.010   Salicyclic Acid <0.5 [SP]   1628 CT HEAD WO CONTRAST  No acute intracranial abnormality [SP]   1628 Patient awake, awake, and oriented.  States that she has a history of seizure for the last year but has not been on medication given that she has not been going to the doctors.  Patient says she started having seizures during the last year and has had complications with aspiration pneumonia requiring ventilation on 1 occasion.  Patient also states father had seizures. [SP]   1712 Cocaine Metabolite, Urine(!): POSITIVE [SP]   1712 Opiates, Urine(!): POSITIVE [SP]   1715 CT HEAD WO CONTRAST  No acute intracranial abnormality. [SP]      ED Course User Index  [SP] Link Mead MD       PROCEDURES:      CONSULTS:  None    CRITICAL CARE:  There was significant risk of life threatening deterioration of patient's condition requiring my direct management. Critical care time  minutes, excluding any documented procedures.    FINAL IMPRESSION      1. Transient alteration of awareness          DISPOSITION / PLAN     DISPOSITION Decision To Discharge 07/03/2024 05:15:28 PM      PATIENT REFERRED TO:  [facility]  NEUROLOGY  [address] [place] [address]  [phone]  Schedule an appointment as soon as possible for a visit   To discuss your symptoms, this ER visit, and evaluation for possible seizures      DISCHARGE MEDICATIONS:  New Prescriptions    No medications on file       Link Mead MD  Emergency Medicine Resident    (Please note that portions of thisnote were completed with a voice recognition program.  Efforts were made to edit the dictations but occasionally words are mis-transcribed.)

## 2024-07-03 NOTE — ED PROVIDER NOTES
[facility]     Emergency Department     Faculty Attestation  2:00 PM EDT      I performed a history and physical examination of the patient and discussed management with the resident. I have reviewed and agree with the resident’s findings including all diagnostic interpretations, and treatment plans as written. Any areas of disagreement are noted on the chart. I was personally present for the key portions of any procedures. I have documented in the chart those procedures where I was not present during the key portions. I have reviewed the emergency nurses triage note. I agree with the chief complaint, past medical history, past surgical history, allergies, medications, social and family history as documented unless otherwise noted below. Documentation of the HPI, Physical Exam and Medical Decision Making performed by scribconnor is based on my personal performance of the HPI, PE and MDM. For Physician Assistant/ Nurse Practitioner cases/documentation I have personally evaluated this patient and have completed at least one if not all key elements of the E/M (history, physical exam, and MDM). Additional findings are as noted.    Primary Care Physician: No primary care provider on file.    Patient brought in by EMS after she was being transferred from [place] to [facility].  And reported seizure-like activity.  Patient arrives catatonic.  She is awake with her eyes open but will not answer or move anything.  Ammonia was used under her nose and patient does not tear and blink her eyes and move her head away.  When you lift her arm up above her face she gently lays it down onto her face.  And an additional time she did hold it straight up.  It was reported that she is approximately 2 months pregnant.  Bedside ultrasound did not show an IUP so we will check hCG, Cerebel rapid EEG was applied to see if there is any seizure-like activity prolactin to be done,  Suspect

## 2024-07-03 NOTE — ED NOTES
Pt to ED via EMS after they were called due to seizure like activity. Pt has hx of seizures, and is currently 2 months pregnant. No other major medical history is known at this time.   Pt is on stretcher with eyes open, pt does not follow commands or speak. Pt on monitor, call light within reach, seizure precautions in place. RN to monitor.

## 2024-07-03 NOTE — ED NOTES
The following labs were labeled with appropriate pt sticker and tubed to lab:     [x] Blue     [x] Lavender   [] on ice  [x] Green/yellow  [x] Green/black [] on ice  [] Grey  [] on ice  [] Yellow  [x] Red  [] Pink  [] Type/ Screen  [] ABG  [] VBG    [] COVID-19 swab    [] Rapid  [] PCR  [] Flu swab  [] Peds Viral Panel     [] Urine Sample  [] Fecal Sample  [] Pelvic Cultures  [] Blood Cultures  [] X 2  [] STREP Cultures  [] Wound Cultures

## 2024-07-03 NOTE — ED PROVIDER NOTES
Faculty Sign-Out Attestation  Handoff taken on the following patient from prior Attending Physician: Ronn    Note Started: 3:22 PM EDT    I was available and discussed any additional care issues that arose and coordinated the management plans with the resident(s) caring for the patient during my duty period. Any areas of disagreement with resident’s documentation of care or procedures are noted on the chart. I was personally present for the key portions of any/all procedures during my duty period. I have documented in the chart those procedures where I was not present during the key portions.    CT HEAD WO CONTRAST   Final Result   No acute intracranial abnormality.               James Morris MD  Attending Physician        James Morris MD  07/03/24 4982

## 2024-07-08 NOTE — PROCEDURES
PROCEDURE NOTE  Date: 7/7/2024   Name: Tabitha Rich  YOB: 1990    Procedures    Date: 7/3/24    Referring physician: Dr. Brody    Indication  Patient aged 34 y with question of seizures. EEG done to assess for epileptiform activity.    Introduction  This routine  2  hour 7 minutes EEG was recorded using the [employer] one band system. Automated seizure detection algorithms were applied.    Description  During the maximal alert state, a well-regulated, symmetric, 6 to 9 Hz frequencies were seen bilaterally.  There was intermittent focal slowing. Stage I and stage II sleep were not clearly observed. There were no interictal epileptiform discharges or electrographic seizures.  High impedence was seen as the study progressed.    Activations  Hyperventilation was not performed. Intermittent photic stimulation was not performed     Impression  There is abnormal EEG with mild encephalopathy.  The faster beta frequencies are suggestive of medication use.    No epileptiform discharges or seizures were identified. Please note the absence of such activity on this record cannot conclusively rule out an epileptic disorder. If such is still clinically suspected, a repeat study with sleep deprivation and/or prolonged sampling may be helpful.    This was a limited 8 channel EEG, which is prone to artifact and a conventional 10-20 lead EEG may be considered if clinically indicated.     Please note this EEG was meant to screen for emergent condition and is prone to artifact and with some limitations. The interpretation of this EEG result should be taken only with clinical correlation. Ideally regular EEG with full leads should be considered when possible.              Detail Level: Detailed Additional Notes: FAVOR EARLY SK\\nRECHECK IN 6 WEEKS Additional Notes: CONSULT IPL

## 2024-07-09 LAB
EKG ATRIAL RATE: 99 BPM
EKG P AXIS: 64 DEGREES
EKG P-R INTERVAL: 124 MS
EKG Q-T INTERVAL: 344 MS
EKG QRS DURATION: 74 MS
EKG QTC CALCULATION (BAZETT): 441 MS
EKG R AXIS: 69 DEGREES
EKG T AXIS: 47 DEGREES
EKG VENTRICULAR RATE: 99 BPM

## 2024-07-28 ENCOUNTER — APPOINTMENT (OUTPATIENT)
Dept: GENERAL RADIOLOGY | Age: 34
End: 2024-07-28
Payer: MEDICAID

## 2024-07-28 ENCOUNTER — HOSPITAL ENCOUNTER (EMERGENCY)
Age: 34
Discharge: HOME | End: 2024-07-28
Attending: EMERGENCY MEDICINE
Payer: MEDICAID

## 2024-07-28 ENCOUNTER — APPOINTMENT (OUTPATIENT)
Dept: CT IMAGING | Age: 34
End: 2024-07-28
Payer: MEDICAID

## 2024-07-28 VITALS
BODY MASS INDEX: 23 KG/M2 | WEIGHT: 125 LBS | HEART RATE: 48 BPM | HEIGHT: 62 IN | DIASTOLIC BLOOD PRESSURE: 83 MMHG | TEMPERATURE: 97.2 F | OXYGEN SATURATION: 100 % | RESPIRATION RATE: 12 BRPM | SYSTOLIC BLOOD PRESSURE: 122 MMHG

## 2024-07-28 DIAGNOSIS — R41.82 ALTERED MENTAL STATUS, UNSPECIFIED ALTERED MENTAL STATUS TYPE: Primary | ICD-10-CM

## 2024-07-28 DIAGNOSIS — E87.6 HYPOKALEMIA: ICD-10-CM

## 2024-07-28 LAB
ANION GAP SERPL CALCULATED.3IONS-SCNC: 14 MMOL/L (ref 9–17)
APAP SERPL-MCNC: <10 UG/ML (ref 10–30)
BASOPHILS # BLD: 0.05 K/UL (ref 0–0.2)
BASOPHILS NFR BLD: 1 % (ref 0–2)
BUN SERPL-MCNC: 13 MG/DL (ref 6–20)
BUN/CREAT SERPL: 22 (ref 9–20)
CALCIUM SERPL-MCNC: 9.1 MG/DL (ref 8.6–10.4)
CHLORIDE SERPL-SCNC: 100 MMOL/L (ref 98–107)
CO2 SERPL-SCNC: 26 MMOL/L (ref 20–31)
CREAT SERPL-MCNC: 0.6 MG/DL (ref 0.5–0.9)
EOSINOPHIL # BLD: 0.24 K/UL (ref 0–0.44)
EOSINOPHILS RELATIVE PERCENT: 3 % (ref 1–4)
ERYTHROCYTE [DISTWIDTH] IN BLOOD BY AUTOMATED COUNT: 14.4 % (ref 11.8–14.4)
ETHANOL PERCENT: <0.01 %
ETHANOLAMINE SERPL-MCNC: <10 MG/DL (ref 0–0.08)
GFR, ESTIMATED: >90 ML/MIN/1.73M2
GLUCOSE SERPL-MCNC: 65 MG/DL (ref 70–99)
HCG SERPL QL: NEGATIVE
HCT VFR BLD AUTO: 41.1 % (ref 36.3–47.1)
HGB BLD-MCNC: 13.1 G/DL (ref 11.9–15.1)
IMM GRANULOCYTES # BLD AUTO: 0.02 K/UL (ref 0–0.3)
IMM GRANULOCYTES NFR BLD: 0 %
LYMPHOCYTES NFR BLD: 2.08 K/UL (ref 1.1–3.7)
LYMPHOCYTES RELATIVE PERCENT: 22 % (ref 24–43)
MAGNESIUM SERPL-MCNC: 2.2 MG/DL (ref 1.6–2.6)
MCH RBC QN AUTO: 27.2 PG (ref 25.2–33.5)
MCHC RBC AUTO-ENTMCNC: 31.9 G/DL (ref 28.4–34.8)
MCV RBC AUTO: 85.3 FL (ref 82.6–102.9)
MONOCYTES NFR BLD: 0.63 K/UL (ref 0.1–1.2)
MONOCYTES NFR BLD: 7 % (ref 3–12)
NEUTROPHILS NFR BLD: 67 % (ref 36–65)
NEUTS SEG NFR BLD: 6.34 K/UL (ref 1.5–8.1)
NRBC BLD-RTO: 0 PER 100 WBC
PHOSPHATE SERPL-MCNC: 3.3 MG/DL (ref 2.6–4.5)
PLATELET # BLD AUTO: 304 K/UL (ref 138–453)
PMV BLD AUTO: 9.8 FL (ref 8.1–13.5)
POTASSIUM SERPL-SCNC: 3.1 MMOL/L (ref 3.7–5.3)
RBC # BLD AUTO: 4.82 M/UL (ref 3.95–5.11)
SALICYLATES SERPL-MCNC: <1 MG/DL (ref 3–10)
SODIUM SERPL-SCNC: 140 MMOL/L (ref 135–144)
TROPONIN I SERPL HS-MCNC: <6 NG/L (ref 0–14)
WBC OTHER # BLD: 9.4 K/UL (ref 3.5–11.3)

## 2024-07-28 PROCEDURE — G0480 DRUG TEST DEF 1-7 CLASSES: HCPCS

## 2024-07-28 PROCEDURE — 80048 BASIC METABOLIC PNL TOTAL CA: CPT

## 2024-07-28 PROCEDURE — 80179 DRUG ASSAY SALICYLATE: CPT

## 2024-07-28 PROCEDURE — 85025 COMPLETE CBC W/AUTO DIFF WBC: CPT

## 2024-07-28 PROCEDURE — 84484 ASSAY OF TROPONIN QUANT: CPT

## 2024-07-28 PROCEDURE — 6370000000 HC RX 637 (ALT 250 FOR IP): Performed by: EMERGENCY MEDICINE

## 2024-07-28 PROCEDURE — 80143 DRUG ASSAY ACETAMINOPHEN: CPT

## 2024-07-28 PROCEDURE — 99285 EMERGENCY DEPT VISIT HI MDM: CPT

## 2024-07-28 PROCEDURE — 84703 CHORIONIC GONADOTROPIN ASSAY: CPT

## 2024-07-28 PROCEDURE — 93005 ELECTROCARDIOGRAM TRACING: CPT | Performed by: EMERGENCY MEDICINE

## 2024-07-28 PROCEDURE — 2580000003 HC RX 258: Performed by: EMERGENCY MEDICINE

## 2024-07-28 PROCEDURE — 83735 ASSAY OF MAGNESIUM: CPT

## 2024-07-28 PROCEDURE — 70450 CT HEAD/BRAIN W/O DYE: CPT

## 2024-07-28 PROCEDURE — 84100 ASSAY OF PHOSPHORUS: CPT

## 2024-07-28 PROCEDURE — 36415 COLL VENOUS BLD VENIPUNCTURE: CPT

## 2024-07-28 PROCEDURE — 71045 X-RAY EXAM CHEST 1 VIEW: CPT

## 2024-07-28 RX ORDER — 0.9 % SODIUM CHLORIDE 0.9 %
1000 INTRAVENOUS SOLUTION INTRAVENOUS ONCE
Status: COMPLETED | OUTPATIENT
Start: 2024-07-28 | End: 2024-07-28

## 2024-07-28 RX ORDER — POTASSIUM CHLORIDE 20 MEQ/1
40 TABLET, EXTENDED RELEASE ORAL ONCE
Status: COMPLETED | OUTPATIENT
Start: 2024-07-28 | End: 2024-07-28

## 2024-07-28 RX ADMIN — POTASSIUM CHLORIDE 40 MEQ: 1500 TABLET, EXTENDED RELEASE ORAL at 09:29

## 2024-07-28 RX ADMIN — SODIUM CHLORIDE 1000 ML: 9 INJECTION, SOLUTION INTRAVENOUS at 09:23

## 2024-07-28 NOTE — ED PROVIDER NOTES
EMERGENCY DEPARTMENT ENCOUNTER    Pt Name: Tabitha Rich  MRN: [id number]  Birthdate 1990  Date of evaluation: 24  CHIEF COMPLAINT       Chief Complaint   Patient presents with    Foot Pain     Bilat feet. Brought in by EMS- states she slept outside all night d/t the foot pain.     HISTORY OF PRESENT ILLNESS   34-year-old female presenting to the ER in an altered mental state.  Patient was found in the street by police and decided to call EMS because of her current state.  Patient admits to bilateral foot pain.  Patient is denying any substance abuse.    The history is provided by the patient and the EMS personnel.   Altered Mental Status  Presenting symptoms: partial responsiveness    Associated symptoms: no abdominal pain, no hallucinations, no headaches, no nausea, no palpitations, no rash and no vomiting            REVIEW OF SYSTEMS     Review of Systems   Constitutional:  Negative for activity change, appetite change and fatigue.   HENT:  Negative for facial swelling, trouble swallowing and voice change.    Eyes:  Negative for photophobia and pain.   Respiratory:  Negative for chest tightness and shortness of breath.    Cardiovascular:  Negative for chest pain and palpitations.   Gastrointestinal:  Negative for abdominal pain, nausea and vomiting.   Genitourinary:  Negative for dysuria and urgency.   Musculoskeletal:  Positive for arthralgias (bilateral feet). Negative for back pain.   Skin:  Negative for color change and rash.   Neurological:  Negative for dizziness, syncope and headaches.   Psychiatric/Behavioral:  Negative for behavioral problems and hallucinations.      PASTMEDICAL HISTORY   No past medical history on file.  Past Problem List  Patient Active Problem List   Diagnosis Code    Pyelonephritis, acute N10    Acute kidney injury (HCC) N17.9    Pneumomediastinum ([facility]) J98.2     SURGICAL HISTORY       Past Surgical History:   Procedure Laterality Date     SECTION       CURRENT

## 2024-07-28 NOTE — ED NOTES
Pt to ED via EMS with c/o bilat foot pain since last night. EMS states pt reported walking last night, her feet began to hurt and she laid down outside to go to sleep, states she had been outside all night. Pt's feet do not have any obvious abrasions, swelling, bruising, or blistering. Pt is responsive to sternal rubs and falls asleep quickly after. Pt will answer questions for a short time following the sternal rubs until she falls back asleep. Agueda Fleming made aware. Pt is resting on ED stretcher connected to SpO2 monitor, call light is in reach, awaiting physician assessment.

## 2024-07-29 LAB
EKG ATRIAL RATE: 40 BPM
EKG P AXIS: 57 DEGREES
EKG P-R INTERVAL: 148 MS
EKG Q-T INTERVAL: 544 MS
EKG QRS DURATION: 100 MS
EKG QTC CALCULATION (BAZETT): 443 MS
EKG R AXIS: 72 DEGREES
EKG T AXIS: 56 DEGREES
EKG VENTRICULAR RATE: 40 BPM

## 2024-08-08 ENCOUNTER — HOSPITAL ENCOUNTER (EMERGENCY)
Age: 34
Discharge: HOME | End: 2024-08-08
Attending: EMERGENCY MEDICINE

## 2024-08-08 VITALS
TEMPERATURE: 98.6 F | DIASTOLIC BLOOD PRESSURE: 79 MMHG | HEART RATE: 68 BPM | RESPIRATION RATE: 20 BRPM | SYSTOLIC BLOOD PRESSURE: 121 MMHG | OXYGEN SATURATION: 100 %

## 2024-08-08 DIAGNOSIS — R46.89 ABNORMAL BEHAVIOR: ICD-10-CM

## 2024-08-08 DIAGNOSIS — R45.1 AGITATION: Primary | ICD-10-CM

## 2024-08-08 LAB
ALBUMIN SERPL-MCNC: 4.5 G/DL (ref 3.5–5.2)
ALBUMIN/GLOB SERPL: 1 {RATIO} (ref 1–2.5)
ALP SERPL-CCNC: 82 U/L (ref 35–104)
ALT SERPL-CCNC: 27 U/L (ref 10–35)
ANION GAP SERPL CALCULATED.3IONS-SCNC: 14 MMOL/L (ref 9–16)
APAP SERPL-MCNC: <5 UG/ML (ref 10–30)
AST SERPL-CCNC: 53 U/L (ref 10–35)
BASOPHILS # BLD: 0 K/UL (ref 0–0.2)
BASOPHILS NFR BLD: 0 % (ref 0–2)
BILIRUB SERPL-MCNC: 0.4 MG/DL (ref 0–1.2)
BUN SERPL-MCNC: 18 MG/DL (ref 6–20)
CALCIUM SERPL-MCNC: 9.3 MG/DL (ref 8.6–10.4)
CHLORIDE SERPL-SCNC: 101 MMOL/L (ref 98–107)
CK SERPL-CCNC: 1290 U/L (ref 26–192)
CK SERPL-CCNC: 867 U/L (ref 26–192)
CO2 SERPL-SCNC: 25 MMOL/L (ref 20–31)
CREAT SERPL-MCNC: 0.9 MG/DL (ref 0.5–0.9)
EOSINOPHIL # BLD: 0 K/UL (ref 0–0.4)
EOSINOPHILS RELATIVE PERCENT: 0 % (ref 1–4)
ERYTHROCYTE [DISTWIDTH] IN BLOOD BY AUTOMATED COUNT: 13.7 % (ref 11.8–14.4)
ETHANOL PERCENT: <0.01 %
ETHANOLAMINE SERPL-MCNC: <10 MG/DL (ref 0–0.08)
GFR, ESTIMATED: >90 ML/MIN/1.73M2
GLUCOSE SERPL-MCNC: 84 MG/DL (ref 74–99)
HCT VFR BLD AUTO: 38.1 % (ref 36.3–47.1)
HGB BLD-MCNC: 12.1 G/DL (ref 11.9–15.1)
IMM GRANULOCYTES # BLD AUTO: 0 K/UL (ref 0–0.3)
IMM GRANULOCYTES NFR BLD: 0 %
LYMPHOCYTES NFR BLD: 4.1 K/UL (ref 1–4.8)
LYMPHOCYTES RELATIVE PERCENT: 24 % (ref 24–44)
MCH RBC QN AUTO: 26.9 PG (ref 25.2–33.5)
MCHC RBC AUTO-ENTMCNC: 31.8 G/DL (ref 28.4–34.8)
MCV RBC AUTO: 84.7 FL (ref 82.6–102.9)
MONOCYTES NFR BLD: 1.71 K/UL (ref 0.1–0.8)
MONOCYTES NFR BLD: 10 % (ref 1–7)
MORPHOLOGY: NORMAL
MYOGLOBIN SERPL-MCNC: 121 NG/ML (ref 25–58)
MYOGLOBIN SERPL-MCNC: 381 NG/ML (ref 25–58)
NEUTROPHILS NFR BLD: 66 % (ref 36–66)
NEUTS SEG NFR BLD: 11.29 K/UL (ref 1.8–7.7)
NRBC BLD-RTO: 0 PER 100 WBC
PLATELET # BLD AUTO: 388 K/UL (ref 138–453)
PMV BLD AUTO: 9.9 FL (ref 8.1–13.5)
POTASSIUM SERPL-SCNC: 3.6 MMOL/L (ref 3.7–5.3)
PROT SERPL-MCNC: 7.6 G/DL (ref 6.6–8.7)
RBC # BLD AUTO: 4.5 M/UL (ref 3.95–5.11)
SALICYLATES SERPL-MCNC: <0.5 MG/DL (ref 0–10)
SODIUM SERPL-SCNC: 140 MMOL/L (ref 136–145)
WBC OTHER # BLD: 17.1 K/UL (ref 3.5–11.3)

## 2024-08-08 PROCEDURE — 99284 EMERGENCY DEPT VISIT MOD MDM: CPT

## 2024-08-08 PROCEDURE — 6360000002 HC RX W HCPCS

## 2024-08-08 PROCEDURE — 83874 ASSAY OF MYOGLOBIN: CPT

## 2024-08-08 PROCEDURE — 2580000003 HC RX 258

## 2024-08-08 PROCEDURE — 85025 COMPLETE CBC W/AUTO DIFF WBC: CPT

## 2024-08-08 PROCEDURE — 80179 DRUG ASSAY SALICYLATE: CPT

## 2024-08-08 PROCEDURE — 80143 DRUG ASSAY ACETAMINOPHEN: CPT

## 2024-08-08 PROCEDURE — 82550 ASSAY OF CK (CPK): CPT

## 2024-08-08 PROCEDURE — 96361 HYDRATE IV INFUSION ADD-ON: CPT

## 2024-08-08 PROCEDURE — G0480 DRUG TEST DEF 1-7 CLASSES: HCPCS

## 2024-08-08 PROCEDURE — 96374 THER/PROPH/DIAG INJ IV PUSH: CPT

## 2024-08-08 PROCEDURE — 80053 COMPREHEN METABOLIC PANEL: CPT

## 2024-08-08 RX ORDER — 0.9 % SODIUM CHLORIDE 0.9 %
1000 INTRAVENOUS SOLUTION INTRAVENOUS ONCE
Status: COMPLETED | OUTPATIENT
Start: 2024-08-08 | End: 2024-08-08

## 2024-08-08 RX ORDER — LORAZEPAM 2 MG/ML
1 INJECTION INTRAMUSCULAR ONCE
Status: COMPLETED | OUTPATIENT
Start: 2024-08-08 | End: 2024-08-08

## 2024-08-08 RX ADMIN — SODIUM CHLORIDE 1000 ML: 9 INJECTION, SOLUTION INTRAVENOUS at 08:25

## 2024-08-08 RX ADMIN — SODIUM CHLORIDE 1000 ML: 9 INJECTION, SOLUTION INTRAVENOUS at 06:15

## 2024-08-08 RX ADMIN — LORAZEPAM 1 MG: 2 INJECTION INTRAMUSCULAR; INTRAVENOUS at 03:32

## 2024-08-08 RX ADMIN — SODIUM CHLORIDE 1000 ML: 9 INJECTION, SOLUTION INTRAVENOUS at 03:32

## 2024-08-08 NOTE — ED NOTES
Patient outside of room stating \"I didn't do anything wrong I just want to go home\" \"patient walking away from nursing station, CASANDRA [place] following, [place] [employer] at bedside   Patient redirected back into room   Dr. Brody, and Dr. Connell at bedside to assess patient

## 2024-08-08 NOTE — ED PROVIDER NOTES
[facility] ED  eMERGENCY dEPARTMENT eNCOUnter   Attending Attestation     Pt Name: Tabitha Rich  MRN: [id number]  Birthdate 1990  Date of evaluation: 8/8/24       Tabitha Rich is a 34 y.o. female who presents with Fatigue      3:54 AM EDT      History: Pt presents with altered mental status and fatigue.     Exam: HRRR, lungs CTABL, abdomen soft and non tender. Pt sleeping    Pt required one of ativan due to manic episode. Pt disorganized and unkempt. Likely behavioral health admit/pink slip due to inability to care for self.     I performed a history and physical examination of the patient and discussed management with the resident. I reviewed the resident’s note and agree with the documented findings and plan of care. Any areas of disagreement are noted on the chart. I was personally present for the key portions of any procedures. I have documented in the chart those procedures where I was not present during the key portions. I have personally reviewed all images and agree with the resident's interpretation. I have reviewed the emergency nurses triage note. I agree with the chief complaint, past medical history, past surgical history, allergies, medications, social and family history as documented unless otherwise noted below. Documentation of the HPI, Physical Exam and Medical Decision Making performed by medical students or scribes is based on my personal performance of the HPI, PE and MDM. For Phys Assistant/ Nurse Practitioner cases/documentation I have had a face to face evaluation of this patient and have completed at least one if not all key elements of the E/M (history, physical exam, and MDM). Additional findings are as noted.    For APC cases I have personally evaluated and examined the patient in conjunction with the APC and agree with the treatment plan and disposition of the patient as recorded by the APC.    Adna Johnston MD  Attending Emergency  Physician       Adan Johnston

## 2024-08-08 NOTE — ED PROVIDER NOTES
[facility] ED  Emergency Department Encounter  Emergency Medicine Resident     Pt Name:Tabitha Rich  MRN: [id number]  Birthdate 1990  Date of evaluation: 24  PCP:  No primary care provider on file.  Note Started: 3:09 AM EDT      CHIEF COMPLAINT       Chief Complaint   Patient presents with    Fatigue       HISTORY OF PRESENT ILLNESS  (Location/Symptom, Timing/Onset, Context/Setting, Quality, Duration, Modifying Factors, Severity.)      Tabitha Rich is a 34 y.o. female who presents with EMS, she was found in the street walking and behaving abnormally.  She states she is okay and she has no pain anywhere, denies fever, vomiting, nausea.  She is laughing and constantly moving in the room.  She is talking to someone in the room when I cannot see anyone.    PAST MEDICAL / SURGICAL / SOCIAL / FAMILY HISTORY      has no past medical history on file.       has a past surgical history that includes  section.      Social History     Socioeconomic History    Marital status: Single     Spouse name: Not on file    Number of children: Not on file    Years of education: Not on file    Highest education level: Not on file   Occupational History    Not on file   Tobacco Use    Smoking status: Some Days     Current packs/day: 0.50     Types: Cigarettes    Smokeless tobacco: Never   Substance and Sexual Activity    Alcohol use: Yes     Comment: social    Drug use: Not Currently     Types: IV     Comment: pt states uses heroin; last used 1/3/16    Sexual activity: Not on file   Other Topics Concern    Not on file   Social History Narrative    ** Merged History Encounter **          Social Determinants of Health     Financial Resource Strain: Not on file   Food Insecurity: Not on file   Transportation Needs: Not on file   Physical Activity: Not on file   Stress: Not on file   Social Connections: Not on file   Intimate Partner Violence: Not on file   Housing Stability: Not on file  cardiologist.    EMERGENCY DEPARTMENT COURSE:      ED Course as of 08/08/24 0430   Thu Aug 08, 2024   0022 I discussed with the attending and mutually agreed to proceed with chemical restrain and labs and fluid. She needs BHI admission eventually,  is notified.  [TS]   0430 1 mg lorazepam was given, patient is sleeping. [TS]      ED Course User Index  [TS] Den Oshea MD       PROCEDURES:  None    CONSULTS:  None    CRITICAL CARE:  There was significant risk of life threatening deterioration of patient's condition requiring my direct management. Critical care time 0 minutes, excluding any documented procedures.    FINAL IMPRESSION      Potential diagnoses are acute berna, psychosis, to rule out rhabdomyolysis and acute kidney injury    DISPOSITION / PLAN     DISPOSITION      Care handed over  Potentially [facility] admission.    PATIENT REFERRED TO:  No follow-up provider specified.    DISCHARGE MEDICATIONS:  New Prescriptions    No medications on file       Den Oshea MD  Emergency Medicine Resident    (Please note that portions of this note were completed with a voice recognition program.  Efforts were made to edit the dictations but occasionally words are mis-transcribed.)

## 2024-08-08 NOTE — ED NOTES
Pt comes to ED via [facility] with c/o malaise. EMS states pt was found walking around streets by [employer], erratic behavior, has been awake for three days, took trazadone without relief of insomnia, denies use of any substances and SI/HI. Pt flaying arms and legs, pacing in room, attempting to clean herself up on the sink. Pt a/o x4, RR even and non labored, call light within reach, Dr Oshea at bedside.

## 2024-08-08 NOTE — DISCHARGE INSTR - COC
transfer of Tabitha Rich  is necessary for the continuing treatment of the diagnosis listed and that she requires {Admit to Appropriate Level of Care:20056} for {GREATER/LESS:044886396} 30 days.     Update Admission H&P: {CHP DME Changes in HandP:524157499}    PHYSICIAN SIGNATURE:  {Esignature:[id number]}

## 2024-08-08 NOTE — ED PROVIDER NOTES
effusion or pneumothorax is present.     No acute cardiopulmonary process.       RECENT VITALS:     Temp: 98.6 °F (37 °C),  Pulse: 68, Respirations: 20, BP: 114/88, SpO2: 97 %    This patient is a 34 y.o. Female with abnormal behavior concern for manic episode.  Patient did require some Ativan.  Plan to reassess when awake.  Possible psychiatric evaluation, will need social work as well.  OUTSTANDING TASKS / RECOMMENDATIONS:    Patient awake, able to have conversation and is ambulatory.  Denies any suicidal ideation or homicidal ideation.  States last night she took some trazodone and went for a walk and then was brought in.  Patient does have a history of a similar episode about a week ago.  At which time she did have positive cocaine and positive opiates on her urine drug screen.  Patient does states she used cocaine 2 days ago.  And has been awake for 2 days.  She does not exhibit any manic behavior.  She is tolerating oral intake.  Evaluated also by social work.  Plan to discharge with outpatient follow-up with mental health resources.      Shauna Brody DO, DO  Attending Emergency Physician  [facility] ED        Shauna Brody DO  08/08/24 1144

## 2024-08-08 NOTE — DISCHARGE INSTRUCTIONS
Thank you for visiting [facility] Emergency Department.    Please follow up with [facility] for outpatient management and psychiatric care.     You need to call a PCP to make an appointment as directed for follow up.    Should you have any questions regarding your care or further treatment, please call [facility] Emergency Department at [phone].

## 2024-08-08 NOTE — ED NOTES
Met with pt, Dr. Brody, and Dr. Connell in pt room. Pt denies SI, HI, and hallucinations. Pt is requesting to be discharged. Pt boyfriend will be picking her up. Mental health resources provided to pt.

## 2024-08-08 NOTE — ED PROVIDER NOTES
CT of the head was performed without the administration of intravenous contrast. Automated exposure control, iterative reconstruction, and/or weight based adjustment of the mA/kV was utilized to reduce the radiation dose to as low as reasonably achievable. COMPARISON: July 3, 2024 HISTORY: ORDERING SYSTEM PROVIDED HISTORY: [facility] TECHNOLOGIST PROVIDED HISTORY: [facility] Decision Support Exception - unselect if not a suspected or confirmed emergency medical condition->Emergency Medical Condition (MA) Reason for Exam: AMS FINDINGS: BRAIN/VENTRICLES: There is no acute intracranial hemorrhage, mass effect or midline shift.  No abnormal extra-axial fluid collection.  The gray-white differentiation is maintained without evidence of an acute infarct.  There is no evidence of hydrocephalus. ORBITS: The visualized portion of the orbits demonstrate no acute abnormality. SINUSES: The visualized paranasal sinuses and mastoid air cells demonstrate no acute abnormality. SOFT TISSUES/SKULL:  No acute abnormality of the visualized skull or soft tissues.  Stable exostosis of the left occipital bone.     No acute intracranial abnormality.     XR CHEST PORTABLE    Result Date: 7/28/2024  EXAMINATION: ONE XRAY VIEW OF THE CHEST 7/28/2024 7:53 am COMPARISON: None. HISTORY: ORDERING SYSTEM PROVIDED HISTORY: [facility] TECHNOLOGIST PROVIDED HISTORY: [facility] Reason for Exam: AMS, pt brought in by EMS, AP supine FINDINGS: The cardiomediastinal silhouette is normal in size. The lungs are clear. No pleural effusion or pneumothorax is present.     No acute cardiopulmonary process.       RECENT VITALS:     Temp: 98.6 °F (37 °C),  Pulse: 68, Respirations: 20, BP: 121/79, SpO2: 100 %    This patient is a 34 y.o. Female with berna, agitation. 2L fluids, mildly elevated CK/Antonio.     ED Course as of 08/08/24 1631   Thu Aug 08, 2024   0331 I discussed with the attending and mutually agreed to proceed with chemical restrain and labs and fluid. She needs [facility] admission

## 2024-08-08 NOTE — ED PROVIDER NOTES
ORDERING SYSTEM PROVIDED HISTORY: [facility]MS TECHNOLOGIST PROVIDED HISTORY: [facility] Decision Support Exception - unselect if not a suspected or confirmed emergency medical condition->Emergency Medical Condition (MA) Reason for Exam: AMS FINDINGS: BRAIN/VENTRICLES: There is no acute intracranial hemorrhage, mass effect or midline shift.  No abnormal extra-axial fluid collection.  The gray-white differentiation is maintained without evidence of an acute infarct.  There is no evidence of hydrocephalus. ORBITS: The visualized portion of the orbits demonstrate no acute abnormality. SINUSES: The visualized paranasal sinuses and mastoid air cells demonstrate no acute abnormality. SOFT TISSUES/SKULL:  No acute abnormality of the visualized skull or soft tissues.  Stable exostosis of the left occipital bone.     No acute intracranial abnormality.     XR CHEST PORTABLE    Result Date: 7/28/2024  EXAMINATION: ONE XRAY VIEW OF THE CHEST 7/28/2024 7:53 am COMPARISON: None. HISTORY: ORDERING SYSTEM PROVIDED HISTORY: [facility] TECHNOLOGIST PROVIDED HISTORY: [facility] Reason for Exam: AMS, pt brought in by EMS, AP supine FINDINGS: The cardiomediastinal silhouette is normal in size. The lungs are clear. No pleural effusion or pneumothorax is present.     No acute cardiopulmonary process.       RECENT VITALS:     Temp: 98.6 °F (37 °C),  Pulse: 68, Respirations: 20, BP: 101/74, SpO2: 97 %    This patient is a 34 y.o. Female with manic-like behavior with visual hallucinations.  Flight of ideas.  Inappropriate laughing.  Found on the street by the police.  Vital signs stable.  Patient received 1 mg of Ativan for agitation.  No suicidal or homicidal ideation.  [facility] labs ordered.  UDS and urinalysis pending.  If patient is not willing to go to [facility], will need to be pink slipped due to acute psychosis and concerns for patient safety.    ED Course as of 08/08/24 0703   Thu Aug 08, 2024   3874 I discussed with the attending and mutually agreed to proceed with